# Patient Record
Sex: FEMALE | Race: WHITE | NOT HISPANIC OR LATINO | Employment: OTHER | URBAN - METROPOLITAN AREA
[De-identification: names, ages, dates, MRNs, and addresses within clinical notes are randomized per-mention and may not be internally consistent; named-entity substitution may affect disease eponyms.]

---

## 2017-05-04 ENCOUNTER — COMMUNICATION - HEALTHEAST (OUTPATIENT)
Dept: FAMILY MEDICINE | Facility: CLINIC | Age: 62
End: 2017-05-04

## 2017-05-08 ENCOUNTER — COMMUNICATION - HEALTHEAST (OUTPATIENT)
Dept: FAMILY MEDICINE | Facility: CLINIC | Age: 62
End: 2017-05-08

## 2017-05-08 ENCOUNTER — OFFICE VISIT - HEALTHEAST (OUTPATIENT)
Dept: FAMILY MEDICINE | Facility: CLINIC | Age: 62
End: 2017-05-08

## 2017-05-08 ENCOUNTER — HOSPITAL ENCOUNTER (OUTPATIENT)
Dept: LAB | Age: 62
Setting detail: SPECIMEN
Discharge: HOME | End: 2017-05-08

## 2017-05-08 DIAGNOSIS — R30.0 DYSURIA: ICD-10-CM

## 2017-05-08 ASSESSMENT — MIFFLIN-ST. JEOR: SCORE: 1349.52

## 2017-05-10 ENCOUNTER — COMMUNICATION - HEALTHEAST (OUTPATIENT)
Dept: FAMILY MEDICINE | Facility: CLINIC | Age: 62
End: 2017-05-10

## 2017-05-15 ENCOUNTER — RECORDS - HEALTHEAST (OUTPATIENT)
Dept: ADMINISTRATIVE | Facility: OTHER | Age: 62
End: 2017-05-15

## 2017-05-31 ENCOUNTER — RECORDS - HEALTHEAST (OUTPATIENT)
Dept: ADMINISTRATIVE | Facility: OTHER | Age: 62
End: 2017-05-31

## 2017-06-07 ENCOUNTER — OFFICE VISIT - HEALTHEAST (OUTPATIENT)
Dept: FAMILY MEDICINE | Facility: CLINIC | Age: 62
End: 2017-06-07

## 2017-06-07 DIAGNOSIS — N21.0 BLADDER STONE: ICD-10-CM

## 2017-06-07 DIAGNOSIS — Z01.818 PRE-OP EXAMINATION: ICD-10-CM

## 2017-06-07 LAB
ATRIAL RATE - MUSE: 64 BPM
DIASTOLIC BLOOD PRESSURE - MUSE: NORMAL MMHG
INTERPRETATION ECG - MUSE: NORMAL
P AXIS - MUSE: 22 DEGREES
PR INTERVAL - MUSE: 196 MS
QRS DURATION - MUSE: 98 MS
QT - MUSE: 430 MS
QTC - MUSE: 443 MS
R AXIS - MUSE: 2 DEGREES
SYSTOLIC BLOOD PRESSURE - MUSE: NORMAL MMHG
T AXIS - MUSE: 20 DEGREES
VENTRICULAR RATE- MUSE: 64 BPM

## 2017-06-07 ASSESSMENT — MIFFLIN-ST. JEOR: SCORE: 1363.12

## 2017-06-26 ENCOUNTER — RECORDS - HEALTHEAST (OUTPATIENT)
Dept: ADMINISTRATIVE | Facility: OTHER | Age: 62
End: 2017-06-26

## 2017-08-16 ENCOUNTER — RECORDS - HEALTHEAST (OUTPATIENT)
Dept: ADMINISTRATIVE | Facility: OTHER | Age: 62
End: 2017-08-16

## 2017-09-08 ENCOUNTER — COMMUNICATION - HEALTHEAST (OUTPATIENT)
Dept: FAMILY MEDICINE | Facility: CLINIC | Age: 62
End: 2017-09-08

## 2017-09-08 DIAGNOSIS — I10 HYPERTENSION: ICD-10-CM

## 2017-10-06 ENCOUNTER — OFFICE VISIT - HEALTHEAST (OUTPATIENT)
Dept: FAMILY MEDICINE | Facility: CLINIC | Age: 62
End: 2017-10-06

## 2017-10-06 DIAGNOSIS — I10 HTN (HYPERTENSION): ICD-10-CM

## 2017-10-06 DIAGNOSIS — Z00.00 HEALTHCARE MAINTENANCE: ICD-10-CM

## 2017-10-06 DIAGNOSIS — I10 HYPERTENSION: ICD-10-CM

## 2017-10-06 DIAGNOSIS — R42 DIZZINESS: ICD-10-CM

## 2017-10-06 ASSESSMENT — MIFFLIN-ST. JEOR: SCORE: 1354.62

## 2017-11-01 ENCOUNTER — RECORDS - HEALTHEAST (OUTPATIENT)
Dept: ADMINISTRATIVE | Facility: OTHER | Age: 62
End: 2017-11-01

## 2017-11-27 ENCOUNTER — RECORDS - HEALTHEAST (OUTPATIENT)
Dept: ADMINISTRATIVE | Facility: OTHER | Age: 62
End: 2017-11-27

## 2017-12-20 ENCOUNTER — RECORDS - HEALTHEAST (OUTPATIENT)
Dept: ADMINISTRATIVE | Facility: OTHER | Age: 62
End: 2017-12-20

## 2018-04-11 ENCOUNTER — COMMUNICATION - HEALTHEAST (OUTPATIENT)
Dept: FAMILY MEDICINE | Facility: CLINIC | Age: 63
End: 2018-04-11

## 2018-04-11 DIAGNOSIS — I10 HTN (HYPERTENSION): ICD-10-CM

## 2018-04-18 ENCOUNTER — COMMUNICATION - HEALTHEAST (OUTPATIENT)
Dept: FAMILY MEDICINE | Facility: CLINIC | Age: 63
End: 2018-04-18

## 2018-04-18 DIAGNOSIS — I10 HYPERTENSION: ICD-10-CM

## 2018-07-02 ENCOUNTER — OFFICE VISIT - HEALTHEAST (OUTPATIENT)
Dept: FAMILY MEDICINE | Facility: CLINIC | Age: 63
End: 2018-07-02

## 2018-07-02 DIAGNOSIS — Z12.11 SCREEN FOR COLON CANCER: ICD-10-CM

## 2018-07-02 DIAGNOSIS — I10 HTN (HYPERTENSION): ICD-10-CM

## 2018-07-02 DIAGNOSIS — I10 HYPERTENSION: ICD-10-CM

## 2018-07-02 LAB
ANION GAP SERPL CALCULATED.3IONS-SCNC: 10 MMOL/L (ref 5–18)
BUN SERPL-MCNC: 11 MG/DL (ref 8–22)
CALCIUM SERPL-MCNC: 9.7 MG/DL (ref 8.5–10.5)
CHLORIDE BLD-SCNC: 100 MMOL/L (ref 98–107)
CO2 SERPL-SCNC: 32 MMOL/L (ref 22–31)
CREAT SERPL-MCNC: 0.7 MG/DL (ref 0.6–1.1)
GFR SERPL CREATININE-BSD FRML MDRD: >60 ML/MIN/1.73M2
GLUCOSE BLD-MCNC: 111 MG/DL (ref 70–125)
POTASSIUM BLD-SCNC: 3.2 MMOL/L (ref 3.5–5)
SODIUM SERPL-SCNC: 142 MMOL/L (ref 136–145)

## 2018-07-02 ASSESSMENT — MIFFLIN-ST. JEOR: SCORE: 1377.02

## 2018-07-25 ENCOUNTER — RECORDS - HEALTHEAST (OUTPATIENT)
Dept: ADMINISTRATIVE | Facility: OTHER | Age: 63
End: 2018-07-25

## 2018-07-26 ENCOUNTER — RECORDS - HEALTHEAST (OUTPATIENT)
Dept: ADMINISTRATIVE | Facility: OTHER | Age: 63
End: 2018-07-26

## 2018-07-26 ENCOUNTER — AMBULATORY - HEALTHEAST (OUTPATIENT)
Dept: LAB | Facility: CLINIC | Age: 63
End: 2018-07-26

## 2018-07-26 ENCOUNTER — COMMUNICATION - HEALTHEAST (OUTPATIENT)
Dept: FAMILY MEDICINE | Facility: CLINIC | Age: 63
End: 2018-07-26

## 2018-07-26 DIAGNOSIS — M79.672 PAIN IN BOTH FEET: ICD-10-CM

## 2018-07-26 DIAGNOSIS — M79.671 PAIN IN BOTH FEET: ICD-10-CM

## 2018-07-27 ENCOUNTER — AMBULATORY - HEALTHEAST (OUTPATIENT)
Dept: LAB | Facility: CLINIC | Age: 63
End: 2018-07-27

## 2018-07-27 DIAGNOSIS — M79.672 PAIN IN BOTH FEET: ICD-10-CM

## 2018-07-27 DIAGNOSIS — M79.671 PAIN IN BOTH FEET: ICD-10-CM

## 2018-07-27 LAB
HBA1C MFR BLD: 5.5 % (ref 3.5–6)
TSH SERPL DL<=0.005 MIU/L-ACNC: 0.99 UIU/ML (ref 0.3–5)
VIT B12 SERPL-MCNC: 448 PG/ML (ref 213–816)

## 2018-08-02 ENCOUNTER — COMMUNICATION - HEALTHEAST (OUTPATIENT)
Dept: FAMILY MEDICINE | Facility: CLINIC | Age: 63
End: 2018-08-02

## 2018-09-05 ENCOUNTER — RECORDS - HEALTHEAST (OUTPATIENT)
Dept: ADMINISTRATIVE | Facility: OTHER | Age: 63
End: 2018-09-05

## 2018-09-06 ENCOUNTER — RECORDS - HEALTHEAST (OUTPATIENT)
Dept: ADMINISTRATIVE | Facility: OTHER | Age: 63
End: 2018-09-06

## 2018-09-25 ENCOUNTER — COMMUNICATION - HEALTHEAST (OUTPATIENT)
Dept: FAMILY MEDICINE | Facility: CLINIC | Age: 63
End: 2018-09-25

## 2018-09-27 ENCOUNTER — AMBULATORY - HEALTHEAST (OUTPATIENT)
Dept: NURSING | Facility: CLINIC | Age: 63
End: 2018-09-27

## 2018-09-27 DIAGNOSIS — Z23 FLU VACCINE NEED: ICD-10-CM

## 2018-11-13 ENCOUNTER — RECORDS - HEALTHEAST (OUTPATIENT)
Dept: ADMINISTRATIVE | Facility: OTHER | Age: 63
End: 2018-11-13

## 2018-11-15 ENCOUNTER — COMMUNICATION - HEALTHEAST (OUTPATIENT)
Dept: FAMILY MEDICINE | Facility: CLINIC | Age: 63
End: 2018-11-15

## 2018-11-15 DIAGNOSIS — Z12.31 ENCOUNTER FOR SCREENING MAMMOGRAM FOR BREAST CANCER: ICD-10-CM

## 2018-11-30 ENCOUNTER — COMMUNICATION - HEALTHEAST (OUTPATIENT)
Dept: FAMILY MEDICINE | Facility: CLINIC | Age: 63
End: 2018-11-30

## 2019-01-02 ENCOUNTER — COMMUNICATION - HEALTHEAST (OUTPATIENT)
Dept: FAMILY MEDICINE | Facility: CLINIC | Age: 64
End: 2019-01-02

## 2019-01-02 DIAGNOSIS — I10 HTN (HYPERTENSION): ICD-10-CM

## 2019-01-07 ENCOUNTER — COMMUNICATION - HEALTHEAST (OUTPATIENT)
Dept: FAMILY MEDICINE | Facility: CLINIC | Age: 64
End: 2019-01-07

## 2019-01-07 DIAGNOSIS — I10 HTN (HYPERTENSION): ICD-10-CM

## 2019-01-14 ENCOUNTER — OFFICE VISIT - HEALTHEAST (OUTPATIENT)
Dept: FAMILY MEDICINE | Facility: CLINIC | Age: 64
End: 2019-01-14

## 2019-01-14 DIAGNOSIS — I10 HTN (HYPERTENSION): ICD-10-CM

## 2019-01-14 DIAGNOSIS — I10 BENIGN ESSENTIAL HYPERTENSION: ICD-10-CM

## 2019-01-14 DIAGNOSIS — R05.9 COUGH: ICD-10-CM

## 2019-01-14 LAB
ANION GAP SERPL CALCULATED.3IONS-SCNC: 8 MMOL/L (ref 5–18)
BUN SERPL-MCNC: 8 MG/DL (ref 8–22)
CALCIUM SERPL-MCNC: 9.4 MG/DL (ref 8.5–10.5)
CHLORIDE BLD-SCNC: 98 MMOL/L (ref 98–107)
CHOLEST SERPL-MCNC: 165 MG/DL
CO2 SERPL-SCNC: 34 MMOL/L (ref 22–31)
CREAT SERPL-MCNC: 0.69 MG/DL (ref 0.6–1.1)
FASTING STATUS PATIENT QL REPORTED: NO
GFR SERPL CREATININE-BSD FRML MDRD: >60 ML/MIN/1.73M2
GLUCOSE BLD-MCNC: 100 MG/DL (ref 70–125)
HDLC SERPL-MCNC: 53 MG/DL
LDLC SERPL CALC-MCNC: 95 MG/DL
POTASSIUM BLD-SCNC: 3.3 MMOL/L (ref 3.5–5)
SODIUM SERPL-SCNC: 140 MMOL/L (ref 136–145)
TRIGL SERPL-MCNC: 87 MG/DL

## 2019-01-14 ASSESSMENT — MIFFLIN-ST. JEOR: SCORE: 1395.45

## 2019-01-16 ENCOUNTER — COMMUNICATION - HEALTHEAST (OUTPATIENT)
Dept: FAMILY MEDICINE | Facility: CLINIC | Age: 64
End: 2019-01-16

## 2019-01-16 DIAGNOSIS — I10 HTN (HYPERTENSION): ICD-10-CM

## 2019-02-01 ENCOUNTER — COMMUNICATION - HEALTHEAST (OUTPATIENT)
Dept: FAMILY MEDICINE | Facility: CLINIC | Age: 64
End: 2019-02-01

## 2019-02-01 DIAGNOSIS — R05.9 COUGH: ICD-10-CM

## 2019-03-04 ENCOUNTER — COMMUNICATION - HEALTHEAST (OUTPATIENT)
Dept: FAMILY MEDICINE | Facility: CLINIC | Age: 64
End: 2019-03-04

## 2019-03-06 ENCOUNTER — RECORDS - HEALTHEAST (OUTPATIENT)
Dept: ADMINISTRATIVE | Facility: OTHER | Age: 64
End: 2019-03-06

## 2019-03-14 ENCOUNTER — RECORDS - HEALTHEAST (OUTPATIENT)
Dept: ADMINISTRATIVE | Facility: OTHER | Age: 64
End: 2019-03-14

## 2019-03-22 ENCOUNTER — OFFICE VISIT - HEALTHEAST (OUTPATIENT)
Dept: FAMILY MEDICINE | Facility: CLINIC | Age: 64
End: 2019-03-22

## 2019-03-22 ENCOUNTER — COMMUNICATION - HEALTHEAST (OUTPATIENT)
Dept: FAMILY MEDICINE | Facility: CLINIC | Age: 64
End: 2019-03-22

## 2019-03-22 DIAGNOSIS — I10 BENIGN ESSENTIAL HYPERTENSION: ICD-10-CM

## 2019-03-22 DIAGNOSIS — Z01.818 PREOPERATIVE EXAMINATION: ICD-10-CM

## 2019-03-22 DIAGNOSIS — H04.203 BILATERAL EPIPHORA: ICD-10-CM

## 2019-03-22 DIAGNOSIS — E87.6 HYPOKALEMIA: ICD-10-CM

## 2019-03-22 LAB
ANION GAP SERPL CALCULATED.3IONS-SCNC: 13 MMOL/L (ref 5–18)
ATRIAL RATE - MUSE: 67 BPM
BUN SERPL-MCNC: 16 MG/DL (ref 8–22)
CALCIUM SERPL-MCNC: 9.4 MG/DL (ref 8.5–10.5)
CHLORIDE BLD-SCNC: 102 MMOL/L (ref 98–107)
CO2 SERPL-SCNC: 29 MMOL/L (ref 22–31)
CREAT SERPL-MCNC: 0.72 MG/DL (ref 0.6–1.1)
DIASTOLIC BLOOD PRESSURE - MUSE: NORMAL MMHG
ERYTHROCYTE [DISTWIDTH] IN BLOOD BY AUTOMATED COUNT: 13.3 % (ref 11–14.5)
GFR SERPL CREATININE-BSD FRML MDRD: >60 ML/MIN/1.73M2
GLUCOSE BLD-MCNC: 86 MG/DL (ref 70–125)
HCT VFR BLD AUTO: 40.4 % (ref 35–47)
HGB BLD-MCNC: 13.8 G/DL (ref 12–16)
INTERPRETATION ECG - MUSE: NORMAL
MCH RBC QN AUTO: 30.1 PG (ref 27–34)
MCHC RBC AUTO-ENTMCNC: 34.2 G/DL (ref 32–36)
MCV RBC AUTO: 88 FL (ref 80–100)
P AXIS - MUSE: -16 DEGREES
PLATELET # BLD AUTO: 233 THOU/UL (ref 140–440)
PMV BLD AUTO: 7.2 FL (ref 7–10)
POTASSIUM BLD-SCNC: 2.8 MMOL/L (ref 3.5–5)
PR INTERVAL - MUSE: 158 MS
QRS DURATION - MUSE: 98 MS
QT - MUSE: 442 MS
QTC - MUSE: 467 MS
R AXIS - MUSE: 12 DEGREES
RBC # BLD AUTO: 4.58 MILL/UL (ref 3.8–5.4)
SODIUM SERPL-SCNC: 144 MMOL/L (ref 136–145)
SYSTOLIC BLOOD PRESSURE - MUSE: NORMAL MMHG
T AXIS - MUSE: 46 DEGREES
VENTRICULAR RATE- MUSE: 67 BPM
WBC: 10.2 THOU/UL (ref 4–11)

## 2019-03-22 ASSESSMENT — MIFFLIN-ST. JEOR: SCORE: 1387.22

## 2019-03-26 ENCOUNTER — AMBULATORY - HEALTHEAST (OUTPATIENT)
Dept: LAB | Facility: CLINIC | Age: 64
End: 2019-03-26

## 2019-03-26 DIAGNOSIS — E87.6 HYPOKALEMIA: ICD-10-CM

## 2019-03-26 LAB
ANION GAP SERPL CALCULATED.3IONS-SCNC: 10 MMOL/L (ref 5–18)
BUN SERPL-MCNC: 11 MG/DL (ref 8–22)
CALCIUM SERPL-MCNC: 9.1 MG/DL (ref 8.5–10.5)
CHLORIDE BLD-SCNC: 100 MMOL/L (ref 98–107)
CO2 SERPL-SCNC: 31 MMOL/L (ref 22–31)
CREAT SERPL-MCNC: 0.66 MG/DL (ref 0.6–1.1)
GFR SERPL CREATININE-BSD FRML MDRD: >60 ML/MIN/1.73M2
GLUCOSE BLD-MCNC: 82 MG/DL (ref 70–125)
POTASSIUM BLD-SCNC: 3.7 MMOL/L (ref 3.5–5)
SODIUM SERPL-SCNC: 141 MMOL/L (ref 136–145)

## 2019-04-05 ENCOUNTER — COMMUNICATION - HEALTHEAST (OUTPATIENT)
Dept: FAMILY MEDICINE | Facility: CLINIC | Age: 64
End: 2019-04-05

## 2019-04-09 ENCOUNTER — RECORDS - HEALTHEAST (OUTPATIENT)
Dept: ADMINISTRATIVE | Facility: OTHER | Age: 64
End: 2019-04-09

## 2019-05-29 ENCOUNTER — RECORDS - HEALTHEAST (OUTPATIENT)
Dept: ADMINISTRATIVE | Facility: OTHER | Age: 64
End: 2019-05-29

## 2019-07-09 ENCOUNTER — COMMUNICATION - HEALTHEAST (OUTPATIENT)
Dept: FAMILY MEDICINE | Facility: CLINIC | Age: 64
End: 2019-07-09

## 2019-07-09 DIAGNOSIS — I10 HTN (HYPERTENSION): ICD-10-CM

## 2019-07-19 ENCOUNTER — OFFICE VISIT - HEALTHEAST (OUTPATIENT)
Dept: FAMILY MEDICINE | Facility: CLINIC | Age: 64
End: 2019-07-19

## 2019-07-19 DIAGNOSIS — Z23 ENCOUNTER FOR IMMUNIZATION: ICD-10-CM

## 2019-07-19 DIAGNOSIS — Z00.00 ROUTINE GENERAL MEDICAL EXAMINATION AT A HEALTH CARE FACILITY: ICD-10-CM

## 2019-07-19 DIAGNOSIS — E87.6 HYPOKALEMIA: ICD-10-CM

## 2019-07-19 DIAGNOSIS — I15.9 SECONDARY HYPERTENSION: ICD-10-CM

## 2019-07-19 LAB
ANION GAP SERPL CALCULATED.3IONS-SCNC: 10 MMOL/L (ref 5–18)
BUN SERPL-MCNC: 10 MG/DL (ref 8–22)
CALCIUM SERPL-MCNC: 9.5 MG/DL (ref 8.5–10.5)
CHLORIDE BLD-SCNC: 101 MMOL/L (ref 98–107)
CO2 SERPL-SCNC: 31 MMOL/L (ref 22–31)
CREAT SERPL-MCNC: 0.66 MG/DL (ref 0.6–1.1)
GFR SERPL CREATININE-BSD FRML MDRD: >60 ML/MIN/1.73M2
GLUCOSE BLD-MCNC: 103 MG/DL (ref 70–125)
POTASSIUM BLD-SCNC: 3 MMOL/L (ref 3.5–5)
SODIUM SERPL-SCNC: 142 MMOL/L (ref 136–145)

## 2019-07-19 ASSESSMENT — MIFFLIN-ST. JEOR: SCORE: 1360.29

## 2019-09-09 ENCOUNTER — RECORDS - HEALTHEAST (OUTPATIENT)
Dept: ADMINISTRATIVE | Facility: OTHER | Age: 64
End: 2019-09-09

## 2019-09-23 ENCOUNTER — RECORDS - HEALTHEAST (OUTPATIENT)
Dept: ADMINISTRATIVE | Facility: OTHER | Age: 64
End: 2019-09-23

## 2019-10-10 ENCOUNTER — RECORDS - HEALTHEAST (OUTPATIENT)
Dept: ADMINISTRATIVE | Facility: OTHER | Age: 64
End: 2019-10-10

## 2019-10-18 ENCOUNTER — COMMUNICATION - HEALTHEAST (OUTPATIENT)
Dept: FAMILY MEDICINE | Facility: CLINIC | Age: 64
End: 2019-10-18

## 2019-10-23 ENCOUNTER — OFFICE VISIT - HEALTHEAST (OUTPATIENT)
Dept: FAMILY MEDICINE | Facility: CLINIC | Age: 64
End: 2019-10-23

## 2019-10-23 DIAGNOSIS — R06.02 SHORTNESS OF BREATH: ICD-10-CM

## 2019-10-23 DIAGNOSIS — Q79.0 BOCHDALEK HERNIA: ICD-10-CM

## 2019-10-23 DIAGNOSIS — Z23 FLU VACCINE NEED: ICD-10-CM

## 2019-10-23 ASSESSMENT — MIFFLIN-ST. JEOR: SCORE: 1346.68

## 2019-12-13 ENCOUNTER — OFFICE VISIT - HEALTHEAST (OUTPATIENT)
Dept: FAMILY MEDICINE | Facility: CLINIC | Age: 64
End: 2019-12-13

## 2019-12-13 DIAGNOSIS — Z01.818 PREOPERATIVE EXAMINATION: ICD-10-CM

## 2019-12-13 DIAGNOSIS — Q79.0 MORGAGNI HERNIA: ICD-10-CM

## 2019-12-13 DIAGNOSIS — Z12.31 VISIT FOR SCREENING MAMMOGRAM: ICD-10-CM

## 2019-12-13 LAB
ANION GAP SERPL CALCULATED.3IONS-SCNC: 9 MMOL/L (ref 5–18)
BUN SERPL-MCNC: 9 MG/DL (ref 8–22)
CALCIUM SERPL-MCNC: 9.6 MG/DL (ref 8.5–10.5)
CHLORIDE BLD-SCNC: 100 MMOL/L (ref 98–107)
CO2 SERPL-SCNC: 32 MMOL/L (ref 22–31)
CREAT SERPL-MCNC: 0.7 MG/DL (ref 0.6–1.1)
ERYTHROCYTE [DISTWIDTH] IN BLOOD BY AUTOMATED COUNT: 11.8 % (ref 11–14.5)
GFR SERPL CREATININE-BSD FRML MDRD: >60 ML/MIN/1.73M2
GLUCOSE BLD-MCNC: 102 MG/DL (ref 70–125)
HCT VFR BLD AUTO: 39.7 % (ref 35–47)
HGB BLD-MCNC: 13.5 G/DL (ref 12–16)
MCH RBC QN AUTO: 30.2 PG (ref 27–34)
MCHC RBC AUTO-ENTMCNC: 34 G/DL (ref 32–36)
MCV RBC AUTO: 89 FL (ref 80–100)
PLATELET # BLD AUTO: 238 THOU/UL (ref 140–440)
PMV BLD AUTO: 7.6 FL (ref 7–10)
POTASSIUM BLD-SCNC: 3.4 MMOL/L (ref 3.5–5)
RBC # BLD AUTO: 4.47 MILL/UL (ref 3.8–5.4)
SODIUM SERPL-SCNC: 141 MMOL/L (ref 136–145)
WBC: 6.6 THOU/UL (ref 4–11)

## 2019-12-13 ASSESSMENT — MIFFLIN-ST. JEOR: SCORE: 1363.13

## 2020-02-27 ENCOUNTER — COMMUNICATION - HEALTHEAST (OUTPATIENT)
Dept: FAMILY MEDICINE | Facility: CLINIC | Age: 65
End: 2020-02-27

## 2020-03-24 ENCOUNTER — COMMUNICATION - HEALTHEAST (OUTPATIENT)
Dept: FAMILY MEDICINE | Facility: CLINIC | Age: 65
End: 2020-03-24

## 2020-03-24 DIAGNOSIS — I10 HTN (HYPERTENSION): ICD-10-CM

## 2020-03-25 ENCOUNTER — COMMUNICATION - HEALTHEAST (OUTPATIENT)
Dept: FAMILY MEDICINE | Facility: CLINIC | Age: 65
End: 2020-03-25

## 2020-03-29 ENCOUNTER — COMMUNICATION - HEALTHEAST (OUTPATIENT)
Dept: FAMILY MEDICINE | Facility: CLINIC | Age: 65
End: 2020-03-29

## 2020-03-29 DIAGNOSIS — I10 HTN (HYPERTENSION): ICD-10-CM

## 2020-04-10 ENCOUNTER — COMMUNICATION - HEALTHEAST (OUTPATIENT)
Dept: FAMILY MEDICINE | Facility: CLINIC | Age: 65
End: 2020-04-10

## 2020-05-07 ENCOUNTER — COMMUNICATION - HEALTHEAST (OUTPATIENT)
Dept: FAMILY MEDICINE | Facility: CLINIC | Age: 65
End: 2020-05-07

## 2020-05-07 DIAGNOSIS — R05.9 COUGH: ICD-10-CM

## 2020-07-08 ENCOUNTER — RECORDS - HEALTHEAST (OUTPATIENT)
Dept: ADMINISTRATIVE | Facility: OTHER | Age: 65
End: 2020-07-08

## 2020-10-02 ENCOUNTER — RECORDS - HEALTHEAST (OUTPATIENT)
Dept: ADMINISTRATIVE | Facility: OTHER | Age: 65
End: 2020-10-02

## 2020-10-13 ENCOUNTER — COMMUNICATION - HEALTHEAST (OUTPATIENT)
Dept: FAMILY MEDICINE | Facility: CLINIC | Age: 65
End: 2020-10-13

## 2020-10-13 DIAGNOSIS — Z01.818 PREOPERATIVE EXAMINATION: ICD-10-CM

## 2020-10-19 ENCOUNTER — OFFICE VISIT - HEALTHEAST (OUTPATIENT)
Dept: FAMILY MEDICINE | Facility: CLINIC | Age: 65
End: 2020-10-19

## 2020-10-19 DIAGNOSIS — Z01.818 PREOPERATIVE EXAMINATION: ICD-10-CM

## 2020-10-19 DIAGNOSIS — M54.42 CHRONIC BILATERAL LOW BACK PAIN WITH LEFT-SIDED SCIATICA: ICD-10-CM

## 2020-10-19 DIAGNOSIS — G89.29 CHRONIC BILATERAL LOW BACK PAIN WITH LEFT-SIDED SCIATICA: ICD-10-CM

## 2020-10-19 LAB
ANION GAP SERPL CALCULATED.3IONS-SCNC: 12 MMOL/L (ref 5–18)
ATRIAL RATE - MUSE: 77 BPM
BUN SERPL-MCNC: 10 MG/DL (ref 8–22)
CALCIUM SERPL-MCNC: 9.3 MG/DL (ref 8.5–10.5)
CHLORIDE BLD-SCNC: 101 MMOL/L (ref 98–107)
CO2 SERPL-SCNC: 29 MMOL/L (ref 22–31)
CREAT SERPL-MCNC: 0.66 MG/DL (ref 0.6–1.1)
DIASTOLIC BLOOD PRESSURE - MUSE: NORMAL
ERYTHROCYTE [DISTWIDTH] IN BLOOD BY AUTOMATED COUNT: 12.3 % (ref 11–14.5)
GFR SERPL CREATININE-BSD FRML MDRD: >60 ML/MIN/1.73M2
GLUCOSE BLD-MCNC: 98 MG/DL (ref 70–125)
HCT VFR BLD AUTO: 42.6 % (ref 35–47)
HGB BLD-MCNC: 14.5 G/DL (ref 12–16)
INTERPRETATION ECG - MUSE: NORMAL
MCH RBC QN AUTO: 30.4 PG (ref 27–34)
MCHC RBC AUTO-ENTMCNC: 34 G/DL (ref 32–36)
MCV RBC AUTO: 89 FL (ref 80–100)
P AXIS - MUSE: 56 DEGREES
PLATELET # BLD AUTO: 222 THOU/UL (ref 140–440)
PMV BLD AUTO: 7 FL (ref 7–10)
POTASSIUM BLD-SCNC: 3.4 MMOL/L (ref 3.5–5)
PR INTERVAL - MUSE: 208 MS
QRS DURATION - MUSE: 90 MS
QT - MUSE: 388 MS
QTC - MUSE: 439 MS
R AXIS - MUSE: -1 DEGREES
RBC # BLD AUTO: 4.76 MILL/UL (ref 3.8–5.4)
SODIUM SERPL-SCNC: 142 MMOL/L (ref 136–145)
SYSTOLIC BLOOD PRESSURE - MUSE: NORMAL
T AXIS - MUSE: 7 DEGREES
VENTRICULAR RATE- MUSE: 77 BPM
WBC: 6.5 THOU/UL (ref 4–11)

## 2020-10-19 ASSESSMENT — MIFFLIN-ST. JEOR: SCORE: 1359.16

## 2020-10-21 ENCOUNTER — RECORDS - HEALTHEAST (OUTPATIENT)
Dept: ADMINISTRATIVE | Facility: OTHER | Age: 65
End: 2020-10-21

## 2020-10-21 ENCOUNTER — COMMUNICATION - HEALTHEAST (OUTPATIENT)
Dept: SCHEDULING | Facility: CLINIC | Age: 65
End: 2020-10-21

## 2020-11-05 ENCOUNTER — RECORDS - HEALTHEAST (OUTPATIENT)
Dept: ADMINISTRATIVE | Facility: OTHER | Age: 65
End: 2020-11-05

## 2020-11-10 ENCOUNTER — OFFICE VISIT - HEALTHEAST (OUTPATIENT)
Dept: FAMILY MEDICINE | Facility: CLINIC | Age: 65
End: 2020-11-10

## 2020-11-10 DIAGNOSIS — Z00.00 ROUTINE GENERAL MEDICAL EXAMINATION AT A HEALTH CARE FACILITY: ICD-10-CM

## 2020-11-10 DIAGNOSIS — I10 HTN (HYPERTENSION): ICD-10-CM

## 2020-11-10 DIAGNOSIS — R05.9 COUGH: ICD-10-CM

## 2020-11-10 LAB
ALBUMIN SERPL-MCNC: 4.3 G/DL (ref 3.5–5)
ALP SERPL-CCNC: 78 U/L (ref 45–120)
ALT SERPL W P-5'-P-CCNC: 14 U/L (ref 0–45)
ANION GAP SERPL CALCULATED.3IONS-SCNC: 9 MMOL/L (ref 5–18)
AST SERPL W P-5'-P-CCNC: 14 U/L (ref 0–40)
BILIRUB SERPL-MCNC: 0.7 MG/DL (ref 0–1)
BUN SERPL-MCNC: 8 MG/DL (ref 8–22)
CALCIUM SERPL-MCNC: 9.2 MG/DL (ref 8.5–10.5)
CHLORIDE BLD-SCNC: 101 MMOL/L (ref 98–107)
CHOLEST SERPL-MCNC: 173 MG/DL
CO2 SERPL-SCNC: 32 MMOL/L (ref 22–31)
CREAT SERPL-MCNC: 0.64 MG/DL (ref 0.6–1.1)
FASTING STATUS PATIENT QL REPORTED: YES
GFR SERPL CREATININE-BSD FRML MDRD: >60 ML/MIN/1.73M2
GLUCOSE BLD-MCNC: 99 MG/DL (ref 70–125)
HDLC SERPL-MCNC: 53 MG/DL
LDLC SERPL CALC-MCNC: 106 MG/DL
POTASSIUM BLD-SCNC: 3.4 MMOL/L (ref 3.5–5)
PROT SERPL-MCNC: 7 G/DL (ref 6–8)
SODIUM SERPL-SCNC: 142 MMOL/L (ref 136–145)
TRIGL SERPL-MCNC: 71 MG/DL

## 2020-11-10 RX ORDER — HYDROCHLOROTHIAZIDE 25 MG/1
25 TABLET ORAL DAILY
Qty: 90 TABLET | Refills: 3 | Status: SHIPPED | OUTPATIENT
Start: 2020-11-10 | End: 2022-01-14

## 2020-11-10 RX ORDER — ALBUTEROL SULFATE 90 UG/1
2 AEROSOL, METERED RESPIRATORY (INHALATION) EVERY 6 HOURS PRN
Qty: 1 EACH | Refills: 0 | Status: SHIPPED | OUTPATIENT
Start: 2020-11-10 | End: 2024-02-09

## 2020-11-10 RX ORDER — AMLODIPINE BESYLATE 5 MG/1
5 TABLET ORAL DAILY
Qty: 90 TABLET | Refills: 3 | Status: SHIPPED | OUTPATIENT
Start: 2020-11-10 | End: 2021-10-08

## 2020-11-10 RX ORDER — POTASSIUM CITRATE 10 MEQ/1
20 TABLET, EXTENDED RELEASE ORAL 2 TIMES DAILY
Qty: 360 TABLET | Refills: 3 | Status: SHIPPED | OUTPATIENT
Start: 2020-11-10 | End: 2022-05-27

## 2020-11-10 RX ORDER — CARVEDILOL 12.5 MG/1
12.5 TABLET ORAL 2 TIMES DAILY WITH MEALS
Qty: 180 TABLET | Refills: 3 | Status: SHIPPED | OUTPATIENT
Start: 2020-11-10 | End: 2022-01-14

## 2020-11-10 ASSESSMENT — MIFFLIN-ST. JEOR: SCORE: 1355.76

## 2020-12-03 ENCOUNTER — RECORDS - HEALTHEAST (OUTPATIENT)
Dept: ADMINISTRATIVE | Facility: OTHER | Age: 65
End: 2020-12-03

## 2021-01-12 ENCOUNTER — RECORDS - HEALTHEAST (OUTPATIENT)
Dept: ADMINISTRATIVE | Facility: OTHER | Age: 66
End: 2021-01-12

## 2021-03-30 ENCOUNTER — RECORDS - HEALTHEAST (OUTPATIENT)
Dept: ADMINISTRATIVE | Facility: OTHER | Age: 66
End: 2021-03-30

## 2021-04-11 ENCOUNTER — HEALTH MAINTENANCE LETTER (OUTPATIENT)
Age: 66
End: 2021-04-11

## 2021-05-26 NOTE — TELEPHONE ENCOUNTER
Called and left message - I will have her take 2 of her potassium citrate 3 times daily and follow up next week for labs    BB

## 2021-05-26 NOTE — PROGRESS NOTES
Preoperative Exam    Scheduled Procedure: Tightening the lower eyelids  Surgery Date:  4/10/19  Surgery Location: [facility]    Surgeon:  Dr. Scott    Assessment/Plan:     1. Preoperative examination  - Electrocardiogram Perform and Read  - Basic Metabolic Panel  - HM2(CBC w/o Differential)    2. Bilateral epiphora    3. Hypokalemia  Initial potassium at 2.8 - she had not been good at taking her potassium supplement and she began taking 20mg two times a day - repeat potassium on 3/26/19 was 3.7  - Basic Metabolic Panel; Future     Surgical Procedure Risk: Low (reported cardiac risk generally < 1%)  Have you had prior anesthesia?: Yes  Have you or any family members had a previous anesthesia reaction:  No  Do you or any family members have a history of a clotting or bleeding disorder?: No  Cardiac Risk Assessment: no increased risk for major cardiac complications    Patient approved for surgery with general or local anesthesia.    Please Note:  Potentially has borderline sleep apnea - planning on seeing sleep specialist but untreated at this time    Functional Status: Independent  Patient plans to recover at home with family.     Subjective:      Kelsey Acevedo is a 63 y.o. female who presents for a preoperative consultation.      She has a past medical history significant for hypertension which is well controlled with amlodipine, carvedilol and hydrochlorothiazide.  The patient does have a tendency to have somewhat low potassium and is taking potassium citrate 10 mEq twice daily.  She unfortunately has not been taking this medication on a very regular basis    She is otherwise doing well.    All other systems reviewed and are negative, other than those listed in the HPI.    Pertinent History  Do you have difficulty breathing or chest pain after walking up a flight of stairs: No  History of obstructive sleep apnea: Yes: mild - could not tolerate mask - will plan on seeing sleep specialist for  re-evaluation soon  Steroid use in the last 6 months: Yes: Back Injection  Frequent Aspirin/NSAID use: No  Prior Blood Transfusion: No  Prior Blood Transfusion Reaction: No  If for some reason prior to, during or after the procedure, if it is medically indicated, would you be willing to have a blood transfusion?:  There is no transfusion refusal.    Current Outpatient Medications   Medication Sig Dispense Refill     amLODIPine (NORVASC) 5 MG tablet TAKE ONE TABLET BY MOUTH EVERY DAY 90 tablet 1     carvedilol (COREG) 12.5 MG tablet TAKE ONE TABLET BY MOUTH TWICE A DAY WITH MEALS 180 tablet 1     gabapentin (NEURONTIN) 300 MG capsule   0     hydroCHLOROthiazide (HYDRODIURIL) 25 MG tablet TAKE ONE TABLET BY MOUTH EVERY DAY 90 tablet 1     potassium citrate (UROCIT-K) 10 mEq (1,080 mg) SR tablet Take 2 tablets (20 mEq total) by mouth 2 (two) times a day. 360 tablet 1     No current facility-administered medications for this visit.         Allergies   Allergen Reactions     Erythromycin Hives     Sulfa (Sulfonamide Antibiotics) Hives       Patient Active Problem List   Diagnosis     Diverticular disease of colon     Benign essential hypertension       Past Medical History:   Diagnosis Date     Bladder stone      Hypertension        Past Surgical History:   Procedure Laterality Date     bladder prolapse       BLADDER STONE REMOVAL       FOOT SURGERY       HYSTERECTOMY         Social History     Socioeconomic History     Marital status:      Spouse name: Not on file     Number of children: Not on file     Years of education: Not on file     Highest education level: Not on file   Occupational History     Not on file   Social Needs     Financial resource strain: Not on file     Food insecurity:     Worry: Not on file     Inability: Not on file     Transportation needs:     Medical: Not on file     Non-medical: Not on file   Tobacco Use     Smoking status: Former Smoker     Smokeless tobacco: Never Used   Substance  "and Sexual Activity     Alcohol use: Not on file     Drug use: Not on file     Sexual activity: Not on file   Lifestyle     Physical activity:     Days per week: Not on file     Minutes per session: Not on file     Stress: Not on file   Relationships     Social connections:     Talks on phone: Not on file     Gets together: Not on file     Attends [employer] service: Not on file     Active member of club or organization: Not on file     Attends meetings of clubs or organizations: Not on file     Relationship status: Not on file     Intimate partner violence:     Fear of current or ex partner: Not on file     Emotionally abused: Not on file     Physically abused: Not on file     Forced sexual activity: Not on file   Other Topics Concern     Not on file   Social History Narrative     Not on file       Patient Care Team:  Millie De Paz MD as PCP - General (Family Medicine)          Objective:     Vitals:    03/22/19 1107   BP: 128/80   Pulse: 76   Resp: 16   Temp: 97.9  F (36.6  C)   TempSrc: Oral   Weight: 196 lb 7 oz (89.1 kg)   Height: 5' 2.5\" (1.588 m)         Physical Exam:  Objective:  Vital signs reviewed and stable  General: No acute distress  Psych: Appropriate affect  HEENT: moist mucous membranes, pupils equal, round, reactive to light and accomodation, posterior oropharynx clear of erythema or exudate, tympanic membranes are pearly grey bilaterally  Lymph: no cervical or supraclavicular lymphadenopathy  Cardiovascular: regular rate and rhythm with no murmur  Pulmonary: clear to auscultation bilaterally with no wheeze  Abdomen: soft, non tender, non distended with normo-active bowel sounds  Extremities: warm and well perfused with no edema  Skin: warm and dry with no rash      There are no Patient Instructions on file for this visit.    Labs:  Recent Results (from the past 240 hour(s))   Electrocardiogram Perform and Read    Collection Time: 03/22/19 11:15 AM   Result Value Ref Range    SYSTOLIC " BLOOD PRESSURE  mmHg    DIASTOLIC BLOOD PRESSURE  mmHg    VENTRICULAR RATE 67 BPM    ATRIAL RATE 67 BPM    P-R INTERVAL 158 ms    QRS DURATION 98 ms    Q-T INTERVAL 442 ms    QTC CALCULATION (BEZET) 467 ms    P Axis -16 degrees    R AXIS 12 degrees    T AXIS 46 degrees    MUSE DIAGNOSIS       Normal sinus rhythm  Normal ECG  When compared with ECG of 07-JUN-2017 13:41,  Nonspecific T wave abnormality now evident in Lateral leads  Confirmed by FRANCO LAIRD MD LOC:J[facility] ([id number]) on 3/22/2019 2:30:18 PM     Basic Metabolic Panel    Collection Time: 03/22/19 11:35 AM   Result Value Ref Range    Sodium 144 136 - 145 mmol/L    Potassium 2.8 (LL) 3.5 - 5.0 mmol/L    Chloride 102 98 - 107 mmol/L    CO2 29 22 - 31 mmol/L    Anion Gap, Calculation 13 5 - 18 mmol/L    Glucose 86 70 - 125 mg/dL    Calcium 9.4 8.5 - 10.5 mg/dL    BUN 16 8 - 22 mg/dL    Creatinine 0.72 0.60 - 1.10 mg/dL    GFR MDRD Af Amer >60 >60 mL/min/1.73m2    GFR MDRD Non Af Amer >60 >60 mL/min/1.73m2   HM2(CBC w/o Differential)    Collection Time: 03/22/19 11:35 AM   Result Value Ref Range    WBC 10.2 4.0 - 11.0 thou/uL    RBC 4.58 3.80 - 5.40 mill/uL    Hemoglobin 13.8 12.0 - 16.0 g/dL    Hematocrit 40.4 35.0 - 47.0 %    MCV 88 80 - 100 fL    MCH 30.1 27.0 - 34.0 pg    MCHC 34.2 32.0 - 36.0 g/dL    RDW 13.3 11.0 - 14.5 %    Platelets 233 140 - 440 thou/uL    MPV 7.2 7.0 - 10.0 fL   Basic Metabolic Panel    Collection Time: 03/26/19  1:33 PM   Result Value Ref Range    Sodium 141 136 - 145 mmol/L    Potassium 3.7 3.5 - 5.0 mmol/L    Chloride 100 98 - 107 mmol/L    CO2 31 22 - 31 mmol/L    Anion Gap, Calculation 10 5 - 18 mmol/L    Glucose 82 70 - 125 mg/dL    Calcium 9.1 8.5 - 10.5 mg/dL    BUN 11 8 - 22 mg/dL    Creatinine 0.66 0.60 - 1.10 mg/dL    GFR MDRD Af Amer >60 >60 mL/min/1.73m2    GFR MDRD Non Af Amer >60 >60 mL/min/1.73m2       Immunization History   Administered Date(s) Administered     DT (pediatric) 01/01/2001     Influenza [id number], 12/04/2009      Influenza high dose, seasonal 09/27/2018     Influenza, Seasonal, Inj PF IIV3 02/09/2016     Influenza, inj, historic,unspecified 11/28/2006, 09/18/2009, 09/28/2011, 11/05/2012, 02/14/2014     Influenza,inj,MDCK,PF,Quad >4yrs 01/19/2018     Influenza,seasonal quad, PF, 36+MOS 02/03/2017     Tdap 10/06/2017           Electronically signed by Millie De Paz MD 03/26/19 11:20 AM

## 2021-05-30 VITALS — BODY MASS INDEX: 33.18 KG/M2 | HEIGHT: 63 IN | WEIGHT: 187.25 LBS

## 2021-05-30 VITALS — BODY MASS INDEX: 33.4 KG/M2 | HEIGHT: 63 IN | WEIGHT: 188.5 LBS

## 2021-05-30 NOTE — PROGRESS NOTES
Assessment/Plan:    Kelsey Acevedo is a 63 y.o. female presenting for:    1. Secondary hypertension  Blood pressure within goal today.  Continue medications.  - Basic Metabolic Panel    2. Hypokalemia  - Basic Metabolic Panel    3. Routine general medical examination at a health care facility  - Pneumococcal conjugate vaccine 13-valent 6wks-17yrs; >50yrs    4. Encounter for immunization   - Pneumococcal conjugate vaccine 13-valent 6wks-17yrs; >50yrs        Medications Discontinued During This Encounter   Medication Reason     gabapentin (NEURONTIN) 300 MG capsule            Chief Complaint:  Chief Complaint   Patient presents with     Blood Pressure Check       Subjective:   Klesey Acevedo is a pleasant 63-year-old female presented to the clinic today for blood pressure check.  Patient is currently on hydrochlorothiazide, amlodipine and carvedilol.  She is doing well with these medications.  Blood pressures were slightly elevated but now they have returned to normal.  She states that even at her dentist office her blood pressure was good.  She is not having any chest pain or shortness of breath.  No lightheadedness.  She really has no questions or concerns regarding these today.    Otherwise.  Patient is due for her pneumonia vaccine as well as her single shingles shot.  She did have shingles several years ago but is never had the immunization.    12 point review of systems completed and negative except for what has been described above    Social History     Tobacco Use   Smoking Status Former Smoker   Smokeless Tobacco Never Used       Current Outpatient Medications   Medication Sig     amLODIPine (NORVASC) 5 MG tablet TAKE ONE TABLET BY MOUTH EVERY DAY     carvedilol (COREG) 12.5 MG tablet TAKE ONE TABLET BY MOUTH TWICE A DAY WITH MEALS     cholecalciferol, vitamin D3, (VITAMIN D3 ORAL) Take by mouth.     hydroCHLOROthiazide (HYDRODIURIL) 25 MG tablet TAKE ONE TABLET BY MOUTH EVERY DAY     potassium citrate  "(UROCIT-K) 10 mEq (1,080 mg) SR tablet Take 2 tablets (20 mEq total) by mouth 2 (two) times a day.         Objective:  Vitals:    07/19/19 1347   BP: 112/68   Pulse: 68   Resp: 12   Temp: 98.3  F (36.8  C)   TempSrc: Oral   Weight: 190 lb 8 oz (86.4 kg)   Height: 5' 2.5\" (1.588 m)       Body mass index is 34.29 kg/m .    Vital signs reviewed and stable  General: No acute distress  Psych: Appropriate affect  HEENT: moist mucous membranes  Lymph: no cervical or supraclavicular lymphadenopathy  Cardiovascular: regular rate and rhythm with no murmur  Pulmonary: clear to auscultation bilaterally with no wheeze  Abdomen: soft, non tender, non distended with normo-active bowel sounds  Extremities: warm and well perfused with no edema  Skin: warm and dry with no rash         This note has been dictated and transcribed using voice recognition software.   Any errors in transcription are unintentional and inherent to the software.  "

## 2021-05-30 NOTE — TELEPHONE ENCOUNTER
Refill Approved    Rx renewed per Medication Renewal Policy. Medication was last renewed on 1/8/19.    Livia Freire, [facility] Triage/Med Refill 7/9/2019     Requested Prescriptions   Pending Prescriptions Disp Refills     carvedilol (COREG) 12.5 MG tablet [Pharmacy Med Name: CARVEDILOL 12.5MG TABS] 180 tablet 1     Sig: TAKE ONE TABLET BY MOUTH TWICE A DAY WITH MEALS       Beta-Blockers Refill Protocol Passed - 7/9/2019 12:58 PM        Passed - PCP or prescribing provider visit in past 12 months or next 3 months     Last office visit with prescriber/PCP: 1/14/2019 Millie De Paz MD OR same dept: 1/14/2019 Millie De Paz MD OR same specialty: 1/14/2019 Millie De Paz MD  Last physical: 3/22/2019 Last MTM visit: Visit date not found   Next visit within 3 mo: Visit date not found  Next physical within 3 mo: Visit date not found  Prescriber OR PCP: Millie De Paz MD  Last diagnosis associated with med order: 1. HTN (hypertension)  - carvedilol (COREG) 12.5 MG tablet [Pharmacy Med Name: CARVEDILOL 12.5MG TABS]; TAKE ONE TABLET BY MOUTH TWICE A DAY WITH MEALS  Dispense: 180 tablet; Refill: 1  - hydroCHLOROthiazide (HYDRODIURIL) 25 MG tablet [Pharmacy Med Name: HYDROCHLOROTHIAZIDE 25MG TABS]; TAKE ONE TABLET BY MOUTH EVERY DAY  Dispense: 90 tablet; Refill: 1  - amLODIPine (NORVASC) 5 MG tablet [Pharmacy Med Name: AMLODIPINE BESYLATE 5MG TABS]; TAKE ONE TABLET BY MOUTH EVERY DAY  Dispense: 90 tablet; Refill: 1    If protocol passes may refill for 12 months if within 3 months of last provider visit (or a total of 15 months).             Passed - Blood pressure filed in past 12 months     BP Readings from Last 1 Encounters:   03/22/19 128/80             hydroCHLOROthiazide (HYDRODIURIL) 25 MG tablet [Pharmacy Med Name: HYDROCHLOROTHIAZIDE 25MG TABS] 90 tablet 1     Sig: TAKE ONE TABLET BY MOUTH EVERY DAY       Diuretics/Combination Diuretics Refill Protocol  Passed - 7/9/2019 12:58  PM        Passed - Visit with PCP or prescribing provider visit in past 12 months     Last office visit with prescriber/PCP: 1/14/2019 Millie De Paz MD OR same dept: 1/14/2019 Millie De Paz MD OR same specialty: 1/14/2019 Millie De Paz MD  Last physical: 3/22/2019 Last MTM visit: Visit date not found   Next visit within 3 mo: Visit date not found  Next physical within 3 mo: Visit date not found  Prescriber OR PCP: Millie De Paz MD  Last diagnosis associated with med order: 1. HTN (hypertension)  - carvedilol (COREG) 12.5 MG tablet [Pharmacy Med Name: CARVEDILOL 12.5MG TABS]; TAKE ONE TABLET BY MOUTH TWICE A DAY WITH MEALS  Dispense: 180 tablet; Refill: 1  - hydroCHLOROthiazide (HYDRODIURIL) 25 MG tablet [Pharmacy Med Name: HYDROCHLOROTHIAZIDE 25MG TABS]; TAKE ONE TABLET BY MOUTH EVERY DAY  Dispense: 90 tablet; Refill: 1  - amLODIPine (NORVASC) 5 MG tablet [Pharmacy Med Name: AMLODIPINE BESYLATE 5MG TABS]; TAKE ONE TABLET BY MOUTH EVERY DAY  Dispense: 90 tablet; Refill: 1    If protocol passes may refill for 12 months if within 3 months of last provider visit (or a total of 15 months).             Passed - Serum Potassium in past 12 months      Lab Results   Component Value Date    Potassium 3.7 03/26/2019             Passed - Serum Sodium in past 12 months      Lab Results   Component Value Date    Sodium 141 03/26/2019             Passed - Blood pressure on file in past 12 months     BP Readings from Last 1 Encounters:   03/22/19 128/80             Passed - Serum Creatinine in past 12 months      Creatinine   Date Value Ref Range Status   03/26/2019 0.66 0.60 - 1.10 mg/dL Final             amLODIPine (NORVASC) 5 MG tablet [Pharmacy Med Name: AMLODIPINE BESYLATE 5MG TABS] 90 tablet 1     Sig: TAKE ONE TABLET BY MOUTH EVERY DAY       Calcium-Channel Blockers Protocol Passed - 7/9/2019 12:58 PM        Passed - PCP or prescribing provider visit in past 12 months or next 3 months      Last office visit with prescriber/PCP: 1/14/2019 Millie De Paz MD OR same dept: 1/14/2019 Millie De Paz MD OR same specialty: 1/14/2019 Millie De Paz MD  Last physical: 3/22/2019 Last MTM visit: Visit date not found   Next visit within 3 mo: Visit date not found  Next physical within 3 mo: Visit date not found  Prescriber OR PCP: Millie De Paz MD  Last diagnosis associated with med order: 1. HTN (hypertension)  - carvedilol (COREG) 12.5 MG tablet [Pharmacy Med Name: CARVEDILOL 12.5MG TABS]; TAKE ONE TABLET BY MOUTH TWICE A DAY WITH MEALS  Dispense: 180 tablet; Refill: 1  - hydroCHLOROthiazide (HYDRODIURIL) 25 MG tablet [Pharmacy Med Name: HYDROCHLOROTHIAZIDE 25MG TABS]; TAKE ONE TABLET BY MOUTH EVERY DAY  Dispense: 90 tablet; Refill: 1  - amLODIPine (NORVASC) 5 MG tablet [Pharmacy Med Name: AMLODIPINE BESYLATE 5MG TABS]; TAKE ONE TABLET BY MOUTH EVERY DAY  Dispense: 90 tablet; Refill: 1    If protocol passes may refill for 12 months if within 3 months of last provider visit (or a total of 15 months).             Passed - Blood pressure filed in past 12 months     BP Readings from Last 1 Encounters:   03/22/19 128/80

## 2021-05-31 VITALS — WEIGHT: 189.25 LBS | HEIGHT: 63 IN | BODY MASS INDEX: 33.53 KG/M2

## 2021-06-01 VITALS — WEIGHT: 194.19 LBS | HEIGHT: 63 IN | BODY MASS INDEX: 34.41 KG/M2

## 2021-06-02 VITALS — HEIGHT: 63 IN | WEIGHT: 198.25 LBS | BODY MASS INDEX: 35.12 KG/M2

## 2021-06-02 VITALS — WEIGHT: 196.44 LBS | BODY MASS INDEX: 34.8 KG/M2 | HEIGHT: 63 IN

## 2021-06-02 NOTE — PROGRESS NOTES
Assessment/Plan:    Kelsey Acevedo is a 64 y.o. female presenting for:    1. Shortness of breath    2. Bochdalek hernia  The patient is actually doing much better.  I wonder if she had a small amount of fat necrosis in the hernia which caused inflammation and a small pleural effusion.  That this seems to have resolved almost completely.  Oxygen saturation is 97% on recheck today.  She has an appointment with the surgeon at the [facility] next week and will follow with him for likely repair of the hernia.  She will contact me if she needs anything for me.        There are no discontinued medications.        Chief Complaint:  Chief Complaint   Patient presents with     Hospital Visit Follow Up     [facility] F/U 10/14/19 SOB       Subjective:   Kelsey Acevedo is a very pleasant 64-year-old female presenting to the clinic today for follow-up of an ER visit.  Patient was seen in the emergency department on October 14 of this year for shortness of breath.  She had been feeling somewhat short of breath for a few days prior to the visit however on the day of the visit the shortness of breath intensified and she also had some pain in her back.  EKG was normal.  Troponin was normal.  D-dimer was slightly elevated.  A CT angiogram was performed which showed a small Bochdalek hernia.  The fat which extended through the hernia is edematous and potentially related to some associated fat necrosis.  The ER physician spoke with a surgeon who recommended that this would likely need to be repaired although not emergently.  She was told to take ibuprofen and she was taking Advil several times daily and her shortness of breath and pain have overall dissipated.  She has an appointment with the surgeon next week to discuss potentially repairing this.  [facility]    12 point review of systems completed and negative except for what has been described above    Social History     Tobacco Use   Smoking  "Status Former Smoker   Smokeless Tobacco Never Used       Current Outpatient Medications   Medication Sig     amLODIPine (NORVASC) 5 MG tablet TAKE ONE TABLET BY MOUTH EVERY DAY     carvedilol (COREG) 12.5 MG tablet TAKE ONE TABLET BY MOUTH TWICE A DAY WITH MEALS     cholecalciferol, vitamin D3, (VITAMIN D3 ORAL) Take by mouth.     hydroCHLOROthiazide (HYDRODIURIL) 25 MG tablet TAKE ONE TABLET BY MOUTH EVERY DAY     potassium citrate (UROCIT-K) 10 mEq (1,080 mg) SR tablet Take 2 tablets (20 mEq total) by mouth 2 (two) times a day.         Objective:  Vitals:    10/23/19 1024   BP: 122/78   Pulse: 68   Resp: 12   Temp: 98.2  F (36.8  C)   TempSrc: Oral   SpO2: 95%   Weight: 187 lb 8 oz (85 kg)   Height: 5' 2.5\" (1.588 m)       Body mass index is 33.75 kg/m .    Vital signs reviewed and stable  General: No acute distress  Psych: Appropriate affect  HEENT: moist mucous membranes, pupils equal, round, reactive to light and accomodation, posterior oropharynx clear of erythema or exudate, tympanic membranes are pearly grey bilaterally  Lymph: no cervical or supraclavicular lymphadenopathy  Cardiovascular: regular rate and rhythm with no murmur  Pulmonary: clear to auscultation bilaterally with no wheeze  Abdomen: soft, non tender, non distended with normo-active bowel sounds  Extremities: warm and well perfused with no edema  Skin: warm and dry with no rash         This note has been dictated and transcribed using voice recognition software.   Any errors in transcription are unintentional and inherent to the software.  "

## 2021-06-02 NOTE — TELEPHONE ENCOUNTER
Dr. Jose Ysuuf is scheduled with you for an EDF on 10/23/19 and the office note from 10/14/19 is in her chart.

## 2021-06-03 VITALS
BODY MASS INDEX: 33.22 KG/M2 | TEMPERATURE: 98.2 F | DIASTOLIC BLOOD PRESSURE: 78 MMHG | SYSTOLIC BLOOD PRESSURE: 122 MMHG | RESPIRATION RATE: 12 BRPM | HEIGHT: 63 IN | OXYGEN SATURATION: 95 % | HEART RATE: 68 BPM | WEIGHT: 187.5 LBS

## 2021-06-03 VITALS
HEIGHT: 63 IN | HEART RATE: 76 BPM | TEMPERATURE: 98.3 F | SYSTOLIC BLOOD PRESSURE: 110 MMHG | RESPIRATION RATE: 16 BRPM | BODY MASS INDEX: 33.87 KG/M2 | WEIGHT: 191.13 LBS | DIASTOLIC BLOOD PRESSURE: 64 MMHG

## 2021-06-03 VITALS — BODY MASS INDEX: 33.75 KG/M2 | WEIGHT: 190.5 LBS | HEIGHT: 63 IN

## 2021-06-04 VITALS
RESPIRATION RATE: 16 BRPM | HEIGHT: 63 IN | WEIGHT: 189.5 LBS | HEART RATE: 70 BPM | BODY MASS INDEX: 33.58 KG/M2 | TEMPERATURE: 96.4 F | SYSTOLIC BLOOD PRESSURE: 140 MMHG | DIASTOLIC BLOOD PRESSURE: 90 MMHG

## 2021-06-04 VITALS
WEIGHT: 190.25 LBS | SYSTOLIC BLOOD PRESSURE: 143 MMHG | DIASTOLIC BLOOD PRESSURE: 89 MMHG | TEMPERATURE: 97.4 F | BODY MASS INDEX: 33.71 KG/M2 | RESPIRATION RATE: 16 BRPM | HEIGHT: 63 IN | HEART RATE: 81 BPM

## 2021-06-04 NOTE — PROGRESS NOTES
Preoperative Exam    Scheduled Procedure: Hernia Surgery  Surgery Date:  1/10/20  Surgery Location: [facility]    Surgeon:  Dr. Bird Jose    Assessment/Plan:     1. Preoperative examination  - Basic Metabolic Panel  - HM2(CBC w/o Differential)    2. Morgagni hernia    3. Visit for screening mammogram  - Mammo Screening Bilateral; Future     Surgical Procedure Risk: Intermediate (reported cardiac risk generally 1-5%)  Have you had prior anesthesia?: Yes  Have you or any family members had a previous anesthesia reaction:  No  Do you or any family members have a history of a clotting or bleeding disorder?: No  Cardiac Risk Assessment: no increased risk for major cardiac complications    APPROVAL GIVEN to proceed with proposed procedure, without further diagnostic evaluation    Please Note:  The patient states that she has a history of borderline sleep apnea but does not wear a CPAP.    Functional Status: Independent  Patient plans to recover at home with family.     Subjective:      Kelsey Acevedo is a 64 y.o. female who presents for a preoperative consultation.  The patient was seen in the emergency department in mid October of this year for shortness of breath.  Work-up was unremarkable other than a CT scan which showed a diaphragmatic hernia.  This was thought to be the reason for her shortness of breath.  She has not seen a surgeon who diagnosed her with a Morgagni hernia.  This is a preoperative evaluation to have this repaired.  She continues to be symptomatic with increased coughing and some slight shortness of breath due to the hernia.    In addition to the indication for the surgery patient has a past medical history significant for hypertension which is well-controlled on medication.    All other systems reviewed and are negative, other than those listed in the HPI.    Pertinent History  Do you have difficulty breathing or chest pain after walking up a flight of stairs: No  History of  obstructive sleep apnea: Yes: Patient states that she has borderline sleep apnea but does not need a CPAP.  Steroid use in the last 6 months: Yes  Frequent Aspirin/NSAID use: No  Prior Blood Transfusion: No  Prior Blood Transfusion Reaction: No  If for some reason prior to, during or after the procedure, if it is medically indicated, would you be willing to have a blood transfusion?:  There is no transfusion refusal.    Current Outpatient Medications   Medication Sig Dispense Refill     amLODIPine (NORVASC) 5 MG tablet TAKE ONE TABLET BY MOUTH EVERY DAY 90 tablet 2     carvedilol (COREG) 12.5 MG tablet TAKE ONE TABLET BY MOUTH TWICE A DAY WITH MEALS 180 tablet 2     cholecalciferol, vitamin D3, (VITAMIN D3 ORAL) Take by mouth.       hydroCHLOROthiazide (HYDRODIURIL) 25 MG tablet TAKE ONE TABLET BY MOUTH EVERY DAY 90 tablet 2     potassium citrate (UROCIT-K) 10 mEq (1,080 mg) SR tablet Take 2 tablets (20 mEq total) by mouth 2 (two) times a day. 360 tablet 1     No current facility-administered medications for this visit.         Allergies   Allergen Reactions     Erythromycin Hives     Sulfa (Sulfonamide Antibiotics) Hives       Patient Active Problem List   Diagnosis     Diverticular disease of colon     Benign essential hypertension       Past Medical History:   Diagnosis Date     Bladder stone      Hypertension        Past Surgical History:   Procedure Laterality Date     bladder prolapse       BLADDER STONE REMOVAL       eyelid surgery       FOOT SURGERY       HYSTERECTOMY         Social History     Socioeconomic History     Marital status:      Spouse name: Not on file     Number of children: Not on file     Years of education: Not on file     Highest education level: Not on file   Occupational History     Not on file   Social Needs     Financial resource strain: Not on file     Food insecurity:     Worry: Not on file     Inability: Not on file     Transportation needs:     Medical: Not on file      "Non-medical: Not on file   Tobacco Use     Smoking status: Former Smoker     Smokeless tobacco: Never Used   Substance and Sexual Activity     Alcohol use: Not on file     Drug use: Not on file     Sexual activity: Not on file   Lifestyle     Physical activity:     Days per week: Not on file     Minutes per session: Not on file     Stress: Not on file   Relationships     Social connections:     Talks on phone: Not on file     Gets together: Not on file     Attends Restoration service: Not on file     Active member of club or organization: Not on file     Attends meetings of clubs or organizations: Not on file     Relationship status: Not on file     Intimate partner violence:     Fear of current or ex partner: Not on file     Emotionally abused: Not on file     Physically abused: Not on file     Forced sexual activity: Not on file   Other Topics Concern     Not on file   Social History Narrative     Not on file       Patient Care Team:  Millie De Paz MD as PCP - General (Family Medicine)  Millie De Paz MD as Assigned PCP          Objective:     Vitals:    12/13/19 1308   BP: 110/64   Pulse: 76   Resp: 16   Temp: 98.3  F (36.8  C)   TempSrc: Oral   Weight: 191 lb 2 oz (86.7 kg)   Height: 5' 2.5\" (1.588 m)         Physical Exam:  Objective:  Vital signs reviewed and stable  General: No acute distress  Psych: Appropriate affect  HEENT: moist mucous membranes, pupils equal, round, reactive to light and accomodation, posterior oropharynx clear of erythema or exudate, tympanic membranes are pearly grey bilaterally  Lymph: no cervical or supraclavicular lymphadenopathy  Cardiovascular: regular rate and rhythm with no murmur  Pulmonary: clear to auscultation bilaterally with no wheeze  Abdomen: soft, non tender, non distended with normo-active bowel sounds  Extremities: warm and well perfused with no edema  Skin: warm and dry with no rash      There are no Patient Instructions on file for this " visit.    EKG: Done on October 14th 2019 when she was in the emergency room.  Normal sinus rhythm with a rate of 69 bpm.  Normal EKG.    Labs:  Recent Results (from the past 48 hour(s))   Basic Metabolic Panel    Collection Time: 12/13/19  1:31 PM   Result Value Ref Range    Sodium 141 136 - 145 mmol/L    Potassium 3.4 (L) 3.5 - 5.0 mmol/L    Chloride 100 98 - 107 mmol/L    CO2 32 (H) 22 - 31 mmol/L    Anion Gap, Calculation 9 5 - 18 mmol/L    Glucose 102 70 - 125 mg/dL    Calcium 9.6 8.5 - 10.5 mg/dL    BUN 9 8 - 22 mg/dL    Creatinine 0.70 0.60 - 1.10 mg/dL    GFR MDRD Af Amer >60 >60 mL/min/1.73m2    GFR MDRD Non Af Amer >60 >60 mL/min/1.73m2   HM2(CBC w/o Differential)    Collection Time: 12/13/19  1:31 PM   Result Value Ref Range    WBC 6.6 4.0 - 11.0 thou/uL    RBC 4.47 3.80 - 5.40 mill/uL    Hemoglobin 13.5 12.0 - 16.0 g/dL    Hematocrit 39.7 35.0 - 47.0 %    MCV 89 80 - 100 fL    MCH 30.2 27.0 - 34.0 pg    MCHC 34.0 32.0 - 36.0 g/dL    RDW 11.8 11.0 - 14.5 %    Platelets 238 140 - 440 thou/uL    MPV 7.6 7.0 - 10.0 fL        Immunization History   Administered Date(s) Administered     DT (pediatric) 01/01/2001     Influenza [id number], 12/04/2009     Influenza high dose,seasonal,PF, 65+ yrs 09/27/2018, 10/23/2019     Influenza, Seasonal, Inj PF IIV3 02/09/2016     Influenza, inj, historic,unspecified 11/28/2006, 09/18/2009, 09/28/2011, 11/05/2012, 02/14/2014     Influenza,inj,MDCK,PF,Quad >4yrs 01/19/2018     Influenza,seasonal quad, PF, =/> 6months 02/03/2017     Pneumo Conj 13-V (2010&after) 07/19/2019     Tdap 10/06/2017           Electronically signed by Millie De Paz MD 12/15/19 1:12 PM

## 2021-06-07 NOTE — TELEPHONE ENCOUNTER
Dr. De Paz-  See pt's [employer] message and reply via [employer].  Wait it out longer? Telephone visit?

## 2021-06-07 NOTE — TELEPHONE ENCOUNTER
RN cannot approve Refill Request    RN can NOT refill this medication med is not covered by policy/route to provider     . Last office visit: 10/23/2019 Millie De Paz MD Last Physical: 12/13/2019 Last MTM visit: Visit date not found Last visit same specialty: 10/23/2019 Millie De Paz MD.  Next visit within 3 mo: Visit date not found  Next physical within 3 mo: Visit date not found      Livia Freire, [facility] Triage/Med Refill 3/25/2020    Requested Prescriptions   Pending Prescriptions Disp Refills     potassium citrate (UROCIT-K) 10 mEq (1,080 mg) SR tablet [Pharmacy Med Name: Potassium Citrate ER Oral Tablet Extended Release 10 MEQ (1080 MG)] 360 tablet 3     Sig: TAKE TWO TABLETS BY MOUTH TWICE DAILY       There is no refill protocol information for this order

## 2021-06-07 NOTE — TELEPHONE ENCOUNTER
Refill Approved    Rx renewed per Medication Renewal Policy. Medication was last renewed on 7/9/19.    Livia Freire, [facility] Triage/Med Refill 3/31/2020     Requested Prescriptions   Pending Prescriptions Disp Refills     hydroCHLOROthiazide (HYDRODIURIL) 25 MG tablet [Pharmacy Med Name: HYDROCHLOROTHIAZIDE 25MG TABS] 90 tablet 2     Sig: TAKE ONE TABLET BY MOUTH EVERY DAY       Diuretics/Combination Diuretics Refill Protocol  Passed - 3/29/2020  6:27 PM        Passed - Visit with PCP or prescribing provider visit in past 12 months     Last office visit with prescriber/PCP: 10/23/2019 Millie De Paz MD OR same dept: 10/23/2019 Millie De Paz MD OR same specialty: 10/23/2019 Millie De Paz MD  Last physical: 12/13/2019 Last MTM visit: Visit date not found   Next visit within 3 mo: Visit date not found  Next physical within 3 mo: Visit date not found  Prescriber OR PCP: Millie De Paz MD  Last diagnosis associated with med order: 1. HTN (hypertension)  - hydroCHLOROthiazide (HYDRODIURIL) 25 MG tablet [Pharmacy Med Name: HYDROCHLOROTHIAZIDE 25MG TABS]; TAKE ONE TABLET BY MOUTH EVERY DAY  Dispense: 90 tablet; Refill: 2  - carvediloL (COREG) 12.5 MG tablet [Pharmacy Med Name: CARVEDILOL 12.5MG TABS]; TAKE ONE TABLET BY MOUTH TWICE A DAY WITH MEALS  Dispense: 180 tablet; Refill: 2  - amLODIPine (NORVASC) 5 MG tablet [Pharmacy Med Name: AMLODIPINE BESYLATE 5MG TABS]; TAKE ONE TABLET BY MOUTH EVERY DAY  Dispense: 90 tablet; Refill: 2    If protocol passes may refill for 12 months if within 3 months of last provider visit (or a total of 15 months).             Passed - Serum Potassium in past 12 months      Lab Results   Component Value Date    Potassium 3.4 (L) 12/13/2019             Passed - Serum Sodium in past 12 months      Lab Results   Component Value Date    Sodium 141 12/13/2019             Passed - Blood pressure on file in past 12 months     BP Readings from Last 1  Encounters:   12/13/19 110/64             Passed - Serum Creatinine in past 12 months      Creatinine   Date Value Ref Range Status   12/13/2019 0.70 0.60 - 1.10 mg/dL Final                carvediloL (COREG) 12.5 MG tablet [Pharmacy Med Name: CARVEDILOL 12.5MG TABS] 180 tablet 2     Sig: TAKE ONE TABLET BY MOUTH TWICE A DAY WITH MEALS       Beta-Blockers Refill Protocol Passed - 3/29/2020  6:27 PM        Passed - PCP or prescribing provider visit in past 12 months or next 3 months     Last office visit with prescriber/PCP: 10/23/2019 Millie De Paz MD OR same dept: 10/23/2019 Millie De Paz MD OR same specialty: 10/23/2019 Millie De Paz MD  Last physical: 12/13/2019 Last MTM visit: Visit date not found   Next visit within 3 mo: Visit date not found  Next physical within 3 mo: Visit date not found  Prescriber OR PCP: Millie De Paz MD  Last diagnosis associated with med order: 1. HTN (hypertension)  - hydroCHLOROthiazide (HYDRODIURIL) 25 MG tablet [Pharmacy Med Name: HYDROCHLOROTHIAZIDE 25MG TABS]; TAKE ONE TABLET BY MOUTH EVERY DAY  Dispense: 90 tablet; Refill: 2  - carvediloL (COREG) 12.5 MG tablet [Pharmacy Med Name: CARVEDILOL 12.5MG TABS]; TAKE ONE TABLET BY MOUTH TWICE A DAY WITH MEALS  Dispense: 180 tablet; Refill: 2  - amLODIPine (NORVASC) 5 MG tablet [Pharmacy Med Name: AMLODIPINE BESYLATE 5MG TABS]; TAKE ONE TABLET BY MOUTH EVERY DAY  Dispense: 90 tablet; Refill: 2    If protocol passes may refill for 12 months if within 3 months of last provider visit (or a total of 15 months).             Passed - Blood pressure filed in past 12 months     BP Readings from Last 1 Encounters:   12/13/19 110/64                amLODIPine (NORVASC) 5 MG tablet [Pharmacy Med Name: AMLODIPINE BESYLATE 5MG TABS] 90 tablet 2     Sig: TAKE ONE TABLET BY MOUTH EVERY DAY       Calcium-Channel Blockers Protocol Passed - 3/29/2020  6:27 PM        Passed - PCP or prescribing provider visit in past 12  months or next 3 months     Last office visit with prescriber/PCP: 10/23/2019 Millie De Paz MD OR same dept: 10/23/2019 Millie De Paz MD OR same specialty: 10/23/2019 Millie De Paz MD  Last physical: 12/13/2019 Last MTM visit: Visit date not found   Next visit within 3 mo: Visit date not found  Next physical within 3 mo: Visit date not found  Prescriber OR PCP: Millie De Paz MD  Last diagnosis associated with med order: 1. HTN (hypertension)  - hydroCHLOROthiazide (HYDRODIURIL) 25 MG tablet [Pharmacy Med Name: HYDROCHLOROTHIAZIDE 25MG TABS]; TAKE ONE TABLET BY MOUTH EVERY DAY  Dispense: 90 tablet; Refill: 2  - carvediloL (COREG) 12.5 MG tablet [Pharmacy Med Name: CARVEDILOL 12.5MG TABS]; TAKE ONE TABLET BY MOUTH TWICE A DAY WITH MEALS  Dispense: 180 tablet; Refill: 2  - amLODIPine (NORVASC) 5 MG tablet [Pharmacy Med Name: AMLODIPINE BESYLATE 5MG TABS]; TAKE ONE TABLET BY MOUTH EVERY DAY  Dispense: 90 tablet; Refill: 2    If protocol passes may refill for 12 months if within 3 months of last provider visit (or a total of 15 months).             Passed - Blood pressure filed in past 12 months     BP Readings from Last 1 Encounters:   12/13/19 110/64

## 2021-06-10 NOTE — PROGRESS NOTES
Assessment/Plan:    Kelsey Acevedo is a 61 y.o. female presenting for:    1. Dysuria  Urine analysis appears concerning for a infection at this point.  We will send an antibiotic to the pharmacy.  Discussed risks and benefits of the antibiotic as well as most common side effects.  I encouraged her to keep her appointment with the urologist for evaluation of the bladder stones.  If she continues to have issues with dysuria I would request that she come back to clinic to leave a repeat urine analysis next week once she is done with antibiotics.  If she continues to have blood in her urine it might be worth doing a CT scan to evaluate for stones in her kidneys/ureters/bladder.  - Urinalysis-UC if Indicated  - Culture, Urine  - nitrofurantoin, macrocrystal-monohydrate, (MACROBID) 100 MG capsule; Take 1 capsule (100 mg total) by mouth 2 (two) times a day for 7 days.  Dispense: 14 capsule; Refill: 0        There are no discontinued medications.        Chief Complaint:  Chief Complaint   Patient presents with     Cystitis     Stabbing pain, burning, blood, frequency and urgency- hx of bladder stones        Subjective:   Kelsey Acevedo is a pleasant 61-year-old female presenting to the clinic today with concerns over a possible urinary tract infection.  The patient states that several years back she had bladder stones.  She believes that they were removed but cannot remember exactly how.  She was doing well until about 2 months ago when she started to have some hesitancy with urination particularly in the morning.  She also notes some sharp pain centrally in the pelvic area particularly with urination.  She has had some pain and burning with urination as well as some blood in her urine yesterday.  She states her bowel movements are normal.  She has not been nauseated.  No flank pain or fevers.  No other systemic symptoms.    12 point review of systems completed and negative except for what has been described  "above    History   Smoking Status     Former Smoker   Smokeless Tobacco     Not on file       Current Outpatient Prescriptions   Medication Sig Note     amLODIPine (NORVASC) 5 MG tablet TAKE ONE TABLET BY MOUTH ONE TIME DAILY      aspirin 81 MG EC tablet Take 81 mg by mouth. 8/24/2016: [facility]: [facility]     CALCIUM CARBONATE/VITAMIN D3 (CALTRATE 600 + D ORAL) Take by mouth.      carvedilol (COREG) 12.5 MG tablet Take 1 tablet (12.5 mg total) by mouth 2 (two) times a day with meals.      multivitamin (ONE A DAY) per tablet Take 1 tablet by mouth. 8/24/2016: Received from: [facility]     nitrofurantoin, macrocrystal-monohydrate, (MACROBID) 100 MG capsule Take 1 capsule (100 mg total) by mouth 2 (two) times a day for 7 days.          Objective:  Vitals:    05/08/17 0852   BP: 120/82   Pulse: 68   Resp: 12   Temp: 98.3  F (36.8  C)   TempSrc: Oral   Weight: 187 lb 4 oz (84.9 kg)   Height: 5' 2.75\" (1.594 m)       Vital signs reviewed and stable  General: No acute distress  Psych: Appropriate affect  HEENT: moist mucous membranes, pupils equal, round, reactive to light and accomodation, posterior oropharynx clear of erythema or exudate, tympanic membranes are pearly grey bilaterally  Lymph: no cervical or supraclavicular lymphadenopathy  Cardiovascular: regular rate and rhythm with no murmur  Pulmonary: clear to auscultation bilaterally with no wheeze  Abdomen: soft, non tender, non distended with normo-active bowel sounds  Extremities: warm and well perfused with no edema  Skin: warm and dry with no rash         This note has been dictated and transcribed using voice recognition software.   Any errors in transcription are unintentional and inherent to the software.  "

## 2021-06-11 NOTE — PROGRESS NOTES
Assessment/Plan:      Visit for Preoperative Exam.     Patient approved for surgery with general or local anesthesia. Follow up as needed. Proceed with proposed surgery without additional clinical clarifications.     Subjective:     Scheduled Procedure: Bladder Stone Removal  Surgery Date:  6/9/17  Surgery Location:  [facility]  Surgeon:  Dr. Yoshi Marian is an extremely pleasant 61-year-old female with a past medical history significant for hypertension presenting to the clinic today for preoperative evaluation for a bladder stone removal.  According to her report she has a 1 inch stone in her bladder which need to be broken up and removed.    Current Outpatient Prescriptions   Medication Sig Dispense Refill     amLODIPine (NORVASC) 5 MG tablet TAKE ONE TABLET BY MOUTH ONE TIME DAILY 30 tablet 10     aspirin 81 MG EC tablet Take 81 mg by mouth.       CALCIUM CARBONATE/VITAMIN D3 (CALTRATE 600 + D ORAL) Take by mouth.       carvedilol (COREG) 12.5 MG tablet Take 1 tablet (12.5 mg total) by mouth 2 (two) times a day with meals. 180 tablet 3     multivitamin (ONE A DAY) per tablet Take 1 tablet by mouth.       No current facility-administered medications for this visit.        Allergies   Allergen Reactions     Erythromycin Hives     Sulfa (Sulfonamide Antibiotics) Hives       Immunization History   Administered Date(s) Administered     DT (pediatric) 01/01/2001     Influenza, inj, historic 11/28/2006, 09/18/2009, 09/28/2011, 11/05/2012, 02/14/2014       There is no problem list on file for this patient.      Past Medical History:   Diagnosis Date     Bladder stone      Hypertension        Family History   Problem Relation Age of Onset     Hypertension Mother      Cancer Mother      bladder     Diabetes Father      Heart disease Father        Social History     Social History     Marital status:      Spouse name: N/A     Number of children: N/A     Years of education: N/A     Occupational History      Not on file.     Social History Main Topics     Smoking status: Former Smoker     Smokeless tobacco: Not on file     Alcohol use Not on file     Drug use: Not on file     Sexual activity: Not on file     Other Topics Concern     Not on file     Social History Narrative       Past Surgical History:   Procedure Laterality Date     bladder prolapse       BLADDER STONE REMOVAL       FOOT SURGERY       HYSTERECTOMY         History of Present Illness  Recent Health  Fever: no  Chills: no  Fatigue: no  Chest Pain: no  Cough: no  Dyspnea: no  Urinary Frequency: no  Nausea: no  Vomiting: no  Diarrhea: no  Abdominal Pain: no  Easy Bruising: no  Lower Extremity Swelling: no  Poor Exercise Tolerance: no    Most recent Health Maintenance Visit:  4 month(s) ago    Pertinent History  Prior Anesthesia: yes  Previous Anesthesia Reaction:  no  Diabetes: no  Cardiovascular Disease: Well-controlled hypertension   pulmonary Disease: no  Renal Disease: no  GI Disease: no  Sleep Apnea: no  Thromboembolic Problems: no  Clotting Disorder: no  Bleeding Disorder: no  Transfusion Reaction: no  Impaired Immunity: no  Steroid use in the last 6 months: none  Frequent Aspirin use: no    Family history: See above - no family Hx of easy bruising/bleeding or anesthesia reactions    Social history of there is no transfusion refusal and there are no concerns regarding care after surgery    After surgery, the patient plans to recover at home with family.    Review of Systems  Pertinent items are noted in HPI.  Constitutional: negative  Eyes: negative  Ears, nose, mouth, throat, and face: negative  Respiratory: negative  Cardiovascular: negative  Gastrointestinal: negative  Genitourinary:negative  Integument/breast: negative  Hematologic/lymphatic: negative  Musculoskeletal:negative  Neurological: negative  Behavioral/Psych: negative  Endocrine: negative  Allergic/Immunologic: negative          Objective:         Vitals:    06/07/17 1317   BP: 114/72  "  Pulse: 68   Resp: 16   Temp: 98  F (36.7  C)   TempSrc: Oral   Weight: 188 lb 8 oz (85.5 kg)   Height: 5' 3.25\" (1.607 m)       Vital signs reviewed and stable  General: No acute distress  Psych: Appropriate affect  HEENT: moist mucous membranes, pupils equal, round, reactive to light and accomodation, posterior oropharynx clear of erythema or exudate, tympanic membranes are pearly grey bilaterally  Lymph: no cervical or supraclavicular lymphadenopathy  Cardiovascular: regular rate and rhythm with no murmur  Pulmonary: clear to auscultation bilaterally with no wheeze  Abdomen: soft, non tender, non distended with normo-active bowel sounds  Extremities: warm and well perfused with no edema  Skin: warm and dry with no rash       Recent Results (from the past 240 hour(s))   Electrocardiogram Perform and Read   Result Value Ref Range    SYSTOLIC BLOOD PRESSURE  mmHg    DIASTOLIC BLOOD PRESSURE  mmHg    VENTRICULAR RATE 64 BPM    ATRIAL RATE 64 BPM    P-R INTERVAL 196 ms    QRS DURATION 98 ms    Q-T INTERVAL 430 ms    QTC CALCULATION (BEZET) 443 ms    P Axis 22 degrees    R AXIS 2 degrees    T AXIS 20 degrees    MUSE DIAGNOSIS       Normal sinus rhythm  Normal ECG  No previous ECGs available  Confirmed by FRANCO LAIRD MD LOC:[facility] ([id number]) on 6/7/2017 2:03:22 PM     Basic Metabolic Panel   Result Value Ref Range    Sodium 142 136 - 145 mmol/L    Potassium 3.7 3.5 - 5.0 mmol/L    Chloride 104 98 - 107 mmol/L    CO2 30 22 - 31 mmol/L    Anion Gap, Calculation 8 5 - 18 mmol/L    Glucose 95 70 - 125 mg/dL    Calcium 9.3 8.5 - 10.5 mg/dL    BUN 10 8 - 22 mg/dL    Creatinine 0.71 0.60 - 1.10 mg/dL    GFR MDRD Af Amer >60 >60 mL/min/1.73m2    GFR MDRD Non Af Amer >60 >60 mL/min/1.73m2   Hemoglobin   Result Value Ref Range    Hemoglobin 13.5 12.0 - 16.0 g/dL       "

## 2021-06-12 NOTE — PROGRESS NOTES
[facility]  [address] [facility] [address]  Dept: [phone]  Dept Fax: [phone]  Primary Provider: Millie De Paz MD  Pre-op Performing Provider: MILLIE DE PAZ    PREOPERATIVE EVALUATION:  Today's date: 10/19/2020    Kelsey Acevedo is a 65 y.o. female who presents for a preoperative evaluation.    Surgical Information:  Surgery/Procedure: Microdisectomy  Surgery Location: [facility]  Surgeon: Dr. Wilder Mcknight  Surgery Date: 10/21/20  Time of Surgery: TBD  Where patient plans to recover: At home with family  Fax number for surgical facility: [phone]    Type of Anesthesia Anticipated: General    Subjective     HPI related to upcoming procedure: Kelsey is a very pleasant 65-year-old female with a past medical history significant for hypertension presenting to the clinic today for a preoperative examination.  She has been unfortunately having fairly severe back pain with there is radiating down her legs.  She will be having a microdiscectomy.    Preop Questions 10/17/2020   Have you ever had a heart attack or stroke? No   Have you ever had surgery on your heart or blood vessels, such as a stent placement, a coronary artery bypass, or surgery on an artery in your head, neck, heart, or legs? No   Do you have chest pain with activity? No   Do you have a history of  heart failure? No   Do you currently have a cold, bronchitis or symptoms of other infection? No   Do you have a cough, shortness of breath, or wheezing? UNKNOWN - maybe wheezing occasionally - not currently   Do you or anyone in your family have previous history of blood clots? No   Do you or does anyone in your family have a serious bleeding problem such as prolonged bleeding following surgeries or cuts? No   Have you ever had problems with anemia or been told to take iron pills? No   Have you had any abnormal blood loss such as black, tarry or bloody stools, or abnormal vaginal  bleeding? No   Have you ever had a blood transfusion? No   Are you willing to have a blood transfusion if it is medically needed before, during, or after your surgery? Yes   Have you or any of your relatives ever had problems with anesthesia? No   Do you have sleep apnea, excessive snoring or daytime drowsiness? YES - mild sleep apnea   Do you have a CPAP machine? No   Do you have any artifical heart valves or other implanted medical devices like a pacemaker, defibrillator, or continuous glucose monitor? No   Do you have artificial joints? No   Are you allergic to latex? No     Health Care Directive:  Patient does not have a Health Care Directive or Living Will: Discussed advance care planning with patient; however, patient declined at this time.    Preoperative Review of :    reviewed - no record of controlled substances prescribed.    HYPERTENSION - Patient has longstanding history of HTN , currently denies any symptoms referable to elevated blood pressure. Specifically denies chest pain, palpitations, dyspnea, orthopnea, PND or peripheral edema. Blood pressure readings have been in normal range. Current medication regimen is as listed below. Patient denies any side effects of medication.       Review of Systems  Constitutional, neuro, ENT, endocrine, pulmonary, cardiac, gastrointestinal, genitourinary, musculoskeletal, integument and psychiatric systems are negative, except as otherwise noted.      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 1 each 0     amLODIPine (NORVASC) 5 MG tablet TAKE ONE TABLET BY MOUTH EVERY DAY 90 tablet 2     carvediloL (COREG) 12.5 MG tablet TAKE ONE TABLET BY MOUTH TWICE A DAY WITH MEALS 180 tablet 2     cholecalciferol, vitamin D3, (VITAMIN D3 ORAL) Take by mouth.       hydroCHLOROthiazide (HYDRODIURIL) 25 MG tablet TAKE ONE TABLET BY MOUTH EVERY DAY 90 tablet 2     potassium citrate (UROCIT-K) 10 mEq (1,080 mg) SR tablet TAKE TWO TABLETS BY MOUTH TWICE DAILY  360 tablet 1     No current facility-administered medications for this visit.        Allergies   Allergen Reactions     Erythromycin Hives     Sulfa (Sulfonamide Antibiotics) Hives       Social History     Tobacco Use     Smoking status: Former Smoker     Smokeless tobacco: Never Used   Substance Use Topics     Alcohol use: Not on file      Family History   Problem Relation Age of Onset     Hypertension Mother      Cancer Mother         bladder     Diabetes Father      Heart disease Father      Social History     Substance and Sexual Activity   Drug Use Not on file        Objective     /89   Pulse 81   Temp 97.4  F (36.3  C) (Oral)   Resp 16   Ht 5' 2.5\" (1.588 m)   Wt 190 lb 4 oz (86.3 kg)   Breastfeeding No   BMI 34.24 kg/m    Physical Exam  Objective:  Vital signs reviewed and stable  General: No acute distress  Psych: Appropriate affect  HEENT: moist mucous membranes, pupils equal, round, reactive to light and accomodation, posterior oropharynx clear of erythema or exudate, tympanic membranes are pearly grey bilaterally  Lymph: no cervical or supraclavicular lymphadenopathy  Cardiovascular: regular rate and rhythm with no murmur  Pulmonary: clear to auscultation bilaterally with no wheeze  Abdomen: soft, non tender, non distended with normo-active bowel sounds  Extremities: warm and well perfused with no edema  Skin: warm and dry with " no rash      Recent Labs   Lab Test 12/13/19  1331 07/19/19  1425 03/22/19  1135 03/22/19  1135   HGB 13.5  --   --  13.8     --   --  233    142   < > 144   K 3.4* 3.0*   < > 2.8*   CREATININE 0.70 0.66   < > 0.72    < > = values in this interval not displayed.        PRE-OP Diagnostics:   Recent Results (from the past 168 hour(s))   Basic Metabolic Panel    Collection Time: 10/19/20 10:17 AM   Result Value Ref Range    Sodium 142 136 - 145 mmol/L    Potassium 3.4 (L) 3.5 - 5.0 mmol/L    Chloride 101 98 - 107 mmol/L    CO2 29 22 - 31 mmol/L    Anion Gap, Calculation 12 5 - 18 mmol/L    Glucose 98 70 - 125 mg/dL    Calcium 9.3 8.5 - 10.5 mg/dL    BUN 10 8 - 22 mg/dL    Creatinine 0.66 0.60 - 1.10 mg/dL    GFR MDRD Af Amer >60 >60 mL/min/1.73m2    GFR MDRD Non Af Amer >60 >60 mL/min/1.73m2   HM2(CBC w/o Differential)    Collection Time: 10/19/20 10:17 AM   Result Value Ref Range    WBC 6.5 4.0 - 11.0 thou/uL    RBC 4.76 3.80 - 5.40 mill/uL    Hemoglobin 14.5 12.0 - 16.0 g/dL    Hematocrit 42.6 35.0 - 47.0 %    MCV 89 80 - 100 fL    MCH 30.4 27.0 - 34.0 pg    MCHC 34.0 32.0 - 36.0 g/dL    RDW 12.3 11.0 - 14.5 %    Platelets 222 140 - 440 thou/uL    MPV 7.0 7.0 - 10.0 fL   Electrocardiogram Perform and Read    Collection Time: 10/19/20  3:58 PM   Result Value Ref Range    SYSTOLIC BLOOD PRESSURE      DIASTOLIC BLOOD PRESSURE      VENTRICULAR RATE 77 BPM    ATRIAL RATE 77 BPM    P-R INTERVAL 208 ms    QRS DURATION 90 ms    Q-T INTERVAL 388 ms    QTC CALCULATION (BEZET) 439 ms    P Axis 56 degrees    R AXIS -1 degrees    T AXIS 7 degrees    MUSE DIAGNOSIS       Normal sinus rhythm  Possible Anterior infarct , age undetermined  Abnormal ECG  When compared with ECG of 22-MAR-2019 11:15,  Nonspecific T wave abnormality, worse in Inferior leads  Confirmed by ALEX TURNER, LES LOC:WW ([id number]) on 10/19/2020 1:33:37 PM         REVISED CARDIAC RISK INDEX (RCRI)   The patient has the following serious cardiovascular  risks for perioperative complications:   - No serious cardiac risks = 0 points    RCRI INTERPRETATION: 0 points: Class I (very low risk - 0.4% complication rate)         Assessment & Plan      The proposed surgical procedure is considered INTERMEDIATE risk.    1. Preoperative examination  Electrocardiogram Perform and Read    Basic Metabolic Panel    Asymptomatic COVID-19 Virus (CORONAVIRUS) PCR    HM2(CBC w/o Differential)    CANCELED: Hemoglobin   2. Chronic bilateral low back pain with left-sided sciatica       Possible Sleep Apnea: patient does not use CPAP - discussed having re-evaluation       Risks and Recommendations:  The patient has the following additional risks and recommendations for perioperative complications:   - No identified additional risk factors other than previously addressed    Medication Instructions:  Patient is to take all scheduled medications on the day of surgery EXCEPT for modifications listed below:   Hold hydrochlorothiazide morning of surgery    RECOMMENDATION:  APPROVAL GIVEN to proceed with proposed procedure, without further diagnostic evaluation.    PENDING COVID TESTING    Signed Electronically by: Millie De Paz MD    Copy of this evaluation report is provided to requesting physician.    Preop [facility] Preop Guidelines    Revised Cardiac Risk Index

## 2021-06-13 NOTE — PROGRESS NOTES
"    Assessment/Plan:     Health maintenance female exam.  All questions answered.  PAP UTD  Breast self exam technique reviewed and patient encouraged to perform self-exam monthly.  Discussed healthy lifestyle modifications.  Mammogram ordered.  Labs 3 months ago were normal including a BMP.  The following high BMI interventions were performed this visit: encouragement to exercise and weight monitoring    1. Hypertension  Good blood pressure control today.   - carvedilol (COREG) 12.5 MG tablet; TAKE ONE TABLET (12.5MG) BY MOUTH TWICE A DAY WITH MEALS  Dispense: 180 tablet; Refill: 1  - amLODIPine (NORVASC) 5 MG tablet; TAKE ONE TABLET BY MOUTH ONE TIME DAILY  Dispense: 90 tablet; Refill: 1    2. Healthcare maintenance  - Mammo Screening Bilateral; Future  - Tdap vaccine,  8yo or older,  IM    3. Dizziness  The patient is very concerned about her intermittent dizziness.  I have actually seen her phone of these episodes and diagnosed her with benign paroxysmal positional vertigo.  She did not have any concerning neurologic exam finding at that time.  She would like to see neurology to discuss this further.  I did discuss that there is likely not much that they will be able to do other than have her see physical therapy for vestibular purposes.  I have offered to give her that referral but she refuses and would like to see a neurologist.  Referral was placed today.  - Ambulatory referral to Neurology          Subjective:      Kelsey Acevedo is a 62 y.o. female who presents for an annual exam.  She is overall doing well.  Just a few concerns that she would like to discuss today.    Mainly she asks \"how do I know if I have ever had a stroke.\"  She has a bit of a difficult time extrapolating upon this question.  She had a few levels of benign paroxysmal positional vertigo which tend to onrah on their own.  I actually saw her one time for this and had referred her to vestibular physical therapy however she was never seen " "as her symptoms improved prior to her making appointment.  She states that she can be forgetful at times but cannot give me any specific examples.  She notes that sometimes she will walk into a room and not remember why she entered the room.  She states that she does not get lost while driving or forget people's names.  She does not note any focal neurologic deficit.  She states that her kids wanted her to \"be evaluated for a stroke in the past.\"     Healthy Habits:   Regular Exercise: Yes  Sunscreen Use: Yes  Healthy Diet: Yes  Dental Visits Regularly: Yes  Seat Belt: Yes  Sexually active: No  Self Breast Exam Monthly:Yes  Colonoscopy: Yes  Prevention of Osteoporosis: Yes  Last Dexa: N/A    Immunization History   Administered Date(s) Administered     DT (pediatric) 01/01/2001     Influenza, inj, historic 11/28/2006, 09/18/2009, 09/28/2011, 11/05/2012, 02/14/2014     Tdap 10/06/2017     Immunization status: up to date and documented.    Gynecologic History  No LMP recorded. Patient has had a hysterectomy.  Contraception: post menopausal status  Last mammogram: 2015. Results were: normal      OB History   No data available       Current Outpatient Prescriptions   Medication Sig Dispense Refill     amLODIPine (NORVASC) 5 MG tablet TAKE ONE TABLET BY MOUTH ONE TIME DAILY 90 tablet 1     aspirin 81 MG EC tablet Take 81 mg by mouth.       CALCIUM CARBONATE/VITAMIN D3 (CALTRATE 600 + D ORAL) Take by mouth.       carvedilol (COREG) 12.5 MG tablet TAKE ONE TABLET (12.5MG) BY MOUTH TWICE A DAY WITH MEALS 180 tablet 1     hydroCHLOROthiazide (HYDRODIURIL) 25 MG tablet   1     potassium citrate (UROCIT-K) 10 mEq (1,080 mg) SR tablet   1     No current facility-administered medications for this visit.      Past Medical History:   Diagnosis Date     Bladder stone      Hypertension      Past Surgical History:   Procedure Laterality Date     bladder prolapse       BLADDER STONE REMOVAL       FOOT SURGERY       HYSTERECTOMY   " "    Erythromycin and Sulfa (sulfonamide antibiotics)  Family History   Problem Relation Age of Onset     Hypertension Mother      Cancer Mother      bladder     Diabetes Father      Heart disease Father      Social History     Social History     Marital status:      Spouse name: N/A     Number of children: N/A     Years of education: N/A     Occupational History     Not on file.     Social History Main Topics     Smoking status: Former Smoker     Smokeless tobacco: Not on file     Alcohol use Not on file     Drug use: Not on file     Sexual activity: Not on file     Other Topics Concern     Not on file     Social History Narrative       Review of Systems  General:  Denies problems  Eyes:  Denies problems  Ears/Nose/Throat:  Denies problems  Cardiovascular:  Denies problems  Respiratory:  Denies problems  Gastrointestinal:  Denies problems  Genitourinary:  Denies problems  Musculoskeletal:  Denies problems  Skin:  Denies problems, Neurologic:  Denies problems  Psychiatric:  Denies problems  Endocrine:  Denies problems  Heme/Lymphatic:  Denies problems  Allergic/Immunologic:  Denies problems       Objective:         Vitals:    10/06/17 1601   BP: 128/80   Pulse: 68   Resp: 16   Temp: 98  F (36.7  C)   TempSrc: Oral   Weight: 189 lb 4 oz (85.8 kg)   Height: 5' 2.5\" (1.588 m)       Physical Exam:  General Appearance: Alert, cooperative, no distress, appears stated age   Head: Normocephalic, without obvious abnormality, atraumatic  Eyes: PERRL, conjunctiva/corneas clear, EOM's intact   Ears: Normal TM's and external ear canals, both ears  Nose:Nares normal, septum midline,mucosa normal, no drainage    Throat:Lips, mucosa, and tongue normal; teeth and gums normal  Neck: Supple, symmetrical, trachea midline, no adenopathy;  thyroid: not enlarged, symmetric, no tenderness/mass/nodules  Back: Symmetric, no curvature, ROM normal,  Lungs: Clear to auscultation bilaterally, respirations unlabored  Breasts: No breast " masses, tenderness, asymmetry, or nipple discharge.  Heart: Regular rate and rhythm, S1 and S2 normal, no murmur, rub, or gallop  Abdomen: Soft, non-tender, bowel sounds active all four quadrants,  no masses, no organomegaly  Extremities: Extremities normal, atraumatic, no cyanosis or edema  Skin: Skin color, texture, turgor normal, no rashes or lesions  Lymph nodes: Cervical, supraclavicular, and axillary nodes normal and   Neurologic: Normal

## 2021-06-13 NOTE — PROGRESS NOTES
Assessment and Plan:   Kelsey is a very pleasant 65-year-old female presenting for an annual wellness visit.    Patient has been advised of split billing requirements and indicates understanding: Yes     1. Routine general medical examination at a health care facility    2. HTN (hypertension)  Blood pressure is just slightly above goal today but patient states she did not take her medications this morning.  CMP will be rechecked.  - amLODIPine (NORVASC) 5 MG tablet; Take 1 tablet (5 mg total) by mouth daily.  Dispense: 90 tablet; Refill: 3  - carvediloL (COREG) 12.5 MG tablet; Take 1 tablet (12.5 mg total) by mouth 2 (two) times a day with meals.  Dispense: 180 tablet; Refill: 3  - hydroCHLOROthiazide (HYDRODIURIL) 25 MG tablet; Take 1 tablet (25 mg total) by mouth daily.  Dispense: 90 tablet; Refill: 3  - potassium citrate (UROCIT-K) 10 mEq (1,080 mg) SR tablet; Take 2 tablets (20 mEq total) by mouth 2 (two) times a day.  Dispense: 360 tablet; Refill: 3  - Lipid [place] FASTING  - Comprehensive Metabolic Panel    3. Cough  Refill albuterol sent to the pharmacy.  - albuterol (PROAIR HFA;PROVENTIL HFA;VENTOLIN HFA) 90 mcg/actuation inhaler; Inhale 2 puffs every 6 (six) hours as needed for wheezing.  Dispense: 1 each; Refill: 0     The patient's current medical problems were reviewed.    The following high BMI interventions were performed this visit: encouragement to exercise  The following health maintenance schedule was reviewed with the patient and provided in printed form in the after visit summary:   Health Maintenance   Topic Date Due     HEPATITIS C SCREENING  1955     ADVANCE CARE PLANNING  07/31/1971     ZOSTER VACCINES (1 of 2) 07/31/2003     DXA SCAN  07/31/2018     MAMMOGRAM  11/13/2019     MEDICARE ANNUAL WELLNESS VISIT  11/10/2021     FALL RISK ASSESSMENT  11/10/2021     LIPID  01/14/2024     TD 18+ HE  10/06/2027     COLORECTAL CANCER SCREENING  09/06/2028     Pneumococcal Vaccine: 65+ Years   Completed     INFLUENZA VACCINE RULE BASED  Completed     Pneumococcal Vaccine: Pediatrics (0 to 5 Years) and At-Risk Patients (6 to 64 Years)  Aged Out     HEPATITIS B VACCINES  Aged Out        Subjective:   Chief Complaint: Kelsey Acevedo is an 65 y.o. female here for an Annual Wellness visit.   HPI: Roxanne is a very pleasant 65-year-old female presenting to the clinic today for an annual wellness visit.    She is overall doing well.  About 1 month ago she had a microdiscectomy and notes that she has been feeling much improved from this.  She is trying to get out and walk which has been helpful in her healing process.    She otherwise really has no concerns today.    Review of Systems:   Please see above.  The rest of the review of systems are negative for all systems.    Patient Care Team:  Millie De Paz MD as PCP - General (Family Medicine)  Millie De Paz MD as Assigned PCP     Patient Active Problem List   Diagnosis     Diverticular disease of colon     Benign essential hypertension     Morgagni hernia     Past Medical History:   Diagnosis Date     Bladder stone      Hypertension       Past Surgical History:   Procedure Laterality Date     bladder prolapse       BLADDER STONE REMOVAL       eyelid surgery       FOOT SURGERY       HYSTERECTOMY        Family History   Problem Relation Age of Onset     Hypertension Mother      Cancer Mother         bladder     Diabetes Father      Heart disease Father       Social History     Socioeconomic History     Marital status:      Spouse name: Not on file     Number of children: Not on file     Years of education: Not on file     Highest education level: Not on file   Occupational History     Not on file   Social Needs     Financial resource strain: Not on file     Food insecurity     Worry: Not on file     Inability: Not on file     Transportation needs     Medical: Not on file     Non-medical: Not on file   Tobacco Use     Smoking status: Former  "Smoker     Smokeless tobacco: Never Used   Substance and Sexual Activity     Alcohol use: Not on file     Drug use: Not on file     Sexual activity: Not on file   Lifestyle     Physical activity     Days per week: Not on file     Minutes per session: Not on file     Stress: Not on file   Relationships     Social connections     Talks on phone: Not on file     Gets together: Not on file     Attends Hindu service: Not on file     Active member of club or organization: Not on file     Attends meetings of clubs or organizations: Not on file     Relationship status: Not on file     Intimate partner violence     Fear of current or ex partner: Not on file     Emotionally abused: Not on file     Physically abused: Not on file     Forced sexual activity: Not on file   Other Topics Concern     Not on file   Social History Narrative     Not on file      Current Outpatient Medications   Medication Sig Dispense Refill     albuterol (PROAIR HFA;PROVENTIL HFA;VENTOLIN HFA) 90 mcg/actuation inhaler Inhale 2 puffs every 6 (six) hours as needed for wheezing. 1 each 0     amLODIPine (NORVASC) 5 MG tablet Take 1 tablet (5 mg total) by mouth daily. 90 tablet 3     carvediloL (COREG) 12.5 MG tablet Take 1 tablet (12.5 mg total) by mouth 2 (two) times a day with meals. 180 tablet 3     cholecalciferol, vitamin D3, (VITAMIN D3 ORAL) Take by mouth.       hydroCHLOROthiazide (HYDRODIURIL) 25 MG tablet Take 1 tablet (25 mg total) by mouth daily. 90 tablet 3     potassium citrate (UROCIT-K) 10 mEq (1,080 mg) SR tablet Take 2 tablets (20 mEq total) by mouth 2 (two) times a day. 360 tablet 3     No current facility-administered medications for this visit.       Objective:   Vital Signs:   Visit Vitals  /90   Pulse 70   Temp (!) 96.4  F (35.8  C) (Oral)   Resp 16   Ht 5' 2.5\" (1.588 m)   Wt 189 lb 8 oz (86 kg)   Breastfeeding No   BMI 34.11 kg/m    Repeat blood pressure: 140/90      VisionScreening:  No exam data present     PHYSICAL " EXAM    Physical Exam:  General Appearance: Alert, cooperative, no distress, appears stated age   Head: Normocephalic, without obvious abnormality, atraumatic  Eyes: PERRL, conjunctiva/corneas clear, EOM's intact   Ears: Normal TM's and external ear canals, both ears  Nose:Nares normal, septum midline,mucosa normal, no drainage    Throat:Lips, mucosa, and tongue normal; teeth and gums normal  Neck: Supple, symmetrical, trachea midline, no adenopathy;  thyroid: not enlarged, symmetric, no tenderness/mass/nodules  Back: Symmetric, no curvature, ROM normal,  Lungs: Clear to auscultation bilaterally, respirations unlabored  Breasts: No breast masses, tenderness, asymmetry, or nipple discharge.  Heart: Regular rate and rhythm, S1 and S2 normal, no murmur, rub, or gallop  Abdomen: Soft, non-tender, bowel sounds active all four quadrants,  no masses, no organomegaly  Extremities: Extremities normal, atraumatic, no cyanosis or edema  Skin: Skin color, texture, turgor normal, no rashes or lesions  Lymph nodes: Cervical, supraclavicular, and axillary nodes normal and   Neurologic: Normal            Assessment Results 11/10/2020   Activities of Daily Living No help needed   Instrumental Activities of Daily Living 1 - Full function   Mini Cog Total Score 5   Some recent data might be hidden     A Mini-Cog score of 0-2 suggests the possibility of dementia, score of 3-5 suggests no dementia      Identified Health Risks:     The patient reports that she has difficulty with instrumental activities of daily living.  She was provided with a list of local organizations that provide support services and advised to make a follow up appointment in 12 weeks to address this further.   Information regarding advance directives (living [place]), including where she can download the appropriate form, was provided to the patient via the AVS.

## 2021-06-16 ENCOUNTER — RECORDS - HEALTHEAST (OUTPATIENT)
Dept: ADMINISTRATIVE | Facility: OTHER | Age: 66
End: 2021-06-16

## 2021-06-16 PROBLEM — Q79.0 MORGAGNI HERNIA: Status: ACTIVE | Noted: 2019-10-29

## 2021-06-16 PROBLEM — I10 BENIGN ESSENTIAL HYPERTENSION: Status: ACTIVE | Noted: 2019-03-24

## 2021-06-18 NOTE — PATIENT INSTRUCTIONS - HE
Patient Instructions by Millie De Paz MD at 11/10/2020  1:20 PM     Author: Millie De Paz MD Service: -- Author Type: Physician    Filed: 11/10/2020  1:28 PM Encounter Date: 11/10/2020 Status: Signed    : Millie De Paz MD (Physician)         Patient Education   Instrumental Activities of Daily Living  Your Health Risk Assessment indicates you have difficulties with instrumental activities of daily living which include laundry, housekeeping, banking, shopping, using the telephone, food preparation, transportation, or taking your own medications. Please make a follow up appointment for us to address this issue in more detail.    [employer] has resources available on the following website: [email or web address]     Also, here is a local agency that provides help with meals and other assistance:   [facility] Line: [phone]     Patient Education   Understanding Advance Care Planning  Advance care planning is the process of deciding ones own future medical care. It helps ensure that if you cant speak for yourself, your wishes can still be carried out. The plan is a series of legal documents that note a persons wishes. The documents vary by state. Advance care planning may be done when a person has a serious illness that is expected to get worse. It may be done before major surgery. And it can help you and your family be prepared in case of a major illness or injury. Advance care planning helps with making decisions at these times.       A health care proxy is a person who acts as the voice of a patient when the patient cant speak for himself or herself. The name of this role varies by state. It may be called a Durable Medical Power of  or Durable Power of  for Healthcare. It may be called an agent, surrogate, or advocate. Or it may be called a representative or decision maker. It is an official duty that is identified by a legal document. The  document also varies by state.    Why Is Advance Care Planning Important?  If a person communicates their healthcare wishes:    They will be given medical care that matches their values and goals.    Their family members will not be forced to make decisions in a crisis with no guidance.  Creating a Plan  Making an advance care plan is often done in 3 steps:    Thinking about ones wishes. To create an advance care plan, you should think about what kind of medical treatment you would want if you lose the ability to communicate. Are there any situations in which you would refuse or stop treatment? Are there therapies you would want or not want? And whom do you want to make decisions for you? There are many places to learn more about how to plan for your care. Ask your doctor or  for resources.    Picking a health care proxy. This means choosing a trusted person to speak for you only when you cant speak for yourself. When you cannot make medical decisions, your proxy makes sure the instructions in your advance care plan are followed. A proxy does not make decisions based on his or her own opinions. They must put aside those opinions and values if needed, and carry out your wishes.    Filling out the legal documents. There are several kinds of legal documents for advance care planning. Each one tells health care providers your wishes. The documents may vary by state. They must be signed and may need to be witnessed or notarized. You can cancel or change them whenever you wish. Depending on your state, the documents may include a Healthcare Proxy form, Living Will, Durable Medical Power of , Advance Directive, or others.  The Familys Role  The best help a family can give is to support their loved ones wishes. Open and honest communication is vital. Family should express any concerns they have about the patients choices while the patient can still make decisions.    6682-1319 [employer]  [address] [place] [address]. All rights reserved. This information is not intended as a substitute for professional medical care. Always follow your healthcare professional's instructions.         Also, [facility] offers a free, downloadable health care directive that allows you to share your treatment choices and personal preferences if you cannot communicate your wishes. It also allows you to appoint another person (called a health care agent) to make health care decisions if you are unable to do so. You can download an advance directive by going here: [email or web address]     Patient Education   Personalized Prevention Plan  You are due for the preventive services outlined below.  Your care team is available to assist you in scheduling these services.  If you have already completed any of these items, please share that information with your care team to update in your medical record.  Health Maintenance   Topic Date Due   ? HEPATITIS C SCREENING  1955   ? ADVANCE CARE PLANNING  07/31/1971   ? ZOSTER VACCINES (1 of 2) 07/31/2003   ? DXA SCAN  07/31/2018   ? MAMMOGRAM  11/13/2019   ? Pneumococcal Vaccine: 65+ Years (2 of 2 - PPSV23) 07/19/2020   ? MEDICARE ANNUAL WELLNESS VISIT  11/10/2021   ? FALL RISK ASSESSMENT  11/10/2021   ? LIPID  01/14/2024   ? TD 18+ HE  10/06/2027   ? COLORECTAL CANCER SCREENING  09/06/2028   ? INFLUENZA VACCINE RULE BASED  Completed   ? Pneumococcal Vaccine: Pediatrics (0 to 5 Years) and At-Risk Patients (6 to 64 Years)  Aged Out   ? HEPATITIS B VACCINES  Aged Out

## 2021-06-19 NOTE — PROGRESS NOTES
Assessment/Plan:    Kelsey Acevedo is a 62 y.o. female presenting for:    1. Screen for colon cancer  - Ambulatory referral for Colonoscopy    2. HTN (hypertension)  Well controlled today - BMP  - Basic Metabolic Panel  - amLODIPine (NORVASC) 5 MG tablet; Take 1 tablet (5 mg total) by mouth daily.  Dispense: 90 tablet; Refill: 1  - carvedilol (COREG) 12.5 MG tablet; Take 1 tablet (12.5 mg total) by mouth 2 (two) times a day with meals.  Dispense: 180 tablet; Refill: 1  - hydroCHLOROthiazide (HYDRODIURIL) 25 MG tablet; Take 1 tablet (25 mg total) by mouth daily.  Dispense: 90 tablet; Refill: 1  - potassium citrate (UROCIT-K) 10 mEq (1,080 mg) SR tablet; Take 2 tablets (20 mEq total) by mouth 2 (two) times a day.  Dispense: 360 tablet; Refill: 1      Medications Discontinued During This Encounter   Medication Reason     CALCIUM CARBONATE/VITAMIN D3 (CALTRATE 600 + D ORAL)      amLODIPine (NORVASC) 5 MG tablet Reorder     carvedilol (COREG) 12.5 MG tablet Reorder     hydroCHLOROthiazide (HYDRODIURIL) 25 MG tablet Reorder     potassium citrate (UROCIT-K) 10 mEq (1,080 mg) SR tablet Reorder           Chief Complaint:  Chief Complaint   Patient presents with     Blood Pressure Check     Medication Refill       Subjective:   Kelsey Acevedo is a very pleasant 62-year-old female presenting to the clinic today to discuss her hypertension.  Patient is a past medical history significant for hypertension is currently taking amlodipine, carvedilol and hydrochlorothiazide.  She is doing well with the medications.  She has not had any chest pain or shortness of breath.  She does notice occasional palpitations but they will tend to norah fairly quickly.  She states that she wore an event monitor at one point a few years ago and it was normal.  She is unsure if she had palpitations while she was wearing the monitor.  They do not cause her any shortness of breath or dizziness.  She is unsure if there are any exacerbating  "factors.    Her brother was recently diagnosed with atrial fibrillation.    12 point review of systems completed and negative except for what has been described above    History   Smoking Status     Former Smoker   Smokeless Tobacco     Never Used       Current Outpatient Prescriptions   Medication Sig Note     amLODIPine (NORVASC) 5 MG tablet Take 1 tablet (5 mg total) by mouth daily.      aspirin 81 MG EC tablet Take 81 mg by mouth. 8/24/2016: [facility]     carvedilol (COREG) 12.5 MG tablet Take 1 tablet (12.5 mg total) by mouth 2 (two) times a day with meals.      hydroCHLOROthiazide (HYDRODIURIL) 25 MG tablet Take 1 tablet (25 mg total) by mouth daily.      potassium citrate (UROCIT-K) 10 mEq (1,080 mg) SR tablet Take 2 tablets (20 mEq total) by mouth 2 (two) times a day.          Objective:  Vitals:    07/02/18 1306   BP: 110/72   Pulse: 64   Resp: 12   Temp: 98.2  F (36.8  C)   TempSrc: Oral   Weight: 194 lb 3 oz (88.1 kg)   Height: 5' 2.5\" (1.588 m)       Vital signs reviewed and stable  General: No acute distress  Psych: Appropriate affect  HEENT: moist mucous membranes, pupils equal, round, reactive to light and accomodation, posterior oropharynx clear of erythema or exudate, tympanic membranes are pearly grey bilaterally  Lymph: no cervical or supraclavicular lymphadenopathy  Cardiovascular: regular rate and rhythm with no murmur  Pulmonary: clear to auscultation bilaterally with no wheeze  Abdomen: soft, non tender, non distended with normo-active bowel sounds  Extremities: warm and well perfused with no edema  Skin: warm and dry with no rash         This note has been dictated and transcribed using voice recognition software.   Any errors in transcription are unintentional and inherent to the software.  "

## 2021-06-22 NOTE — TELEPHONE ENCOUNTER
Refill Approved    Rx renewed per Medication Renewal Policy. Medication was last renewed on 7/2/18.    Livia Freire, [facility] Triage/Med Refill 1/8/2019     Requested Prescriptions   Pending Prescriptions Disp Refills     carvedilol (COREG) 12.5 MG tablet [Pharmacy Med Name: CARVEDILOL 12.5MG TABS] 180 tablet 1     Sig: TAKE ONE TABLET BY MOUTH TWICE A DAY WITH MEALS    Beta-Blockers Refill Protocol Passed - 1/7/2019  4:32 AM       Passed - PCP or prescribing provider visit in past 12 months or next 3 months    Last office visit with prescriber/PCP: 7/2/2018 Millie De Paz MD OR same dept: 7/2/2018 Millie De Paz MD OR same specialty: 7/2/2018 Millie De Paz MD  Last physical: 10/6/2017 Last MTM visit: Visit date not found   Next visit within 3 mo: Visit date not found  Next physical within 3 mo: Visit date not found  Prescriber OR PCP: Millie De Paz MD  Last diagnosis associated with med order: 1. HTN (hypertension)  - carvedilol (COREG) 12.5 MG tablet [Pharmacy Med Name: CARVEDILOL 12.5MG TABS]; TAKE ONE TABLET BY MOUTH TWICE A DAY WITH MEALS  Dispense: 180 tablet; Refill: 1  - amLODIPine (NORVASC) 5 MG tablet [Pharmacy Med Name: AMLODIPINE BESYLATE 5MG TABS]; TAKE ONE TABLET BY MOUTH EVERY DAY  Dispense: 90 tablet; Refill: 1    If protocol passes may refill for 12 months if within 3 months of last provider visit (or a total of 15 months).            Passed - Blood pressure filed in past 12 months    BP Readings from Last 1 Encounters:   07/02/18 110/72             amLODIPine (NORVASC) 5 MG tablet [Pharmacy Med Name: AMLODIPINE BESYLATE 5MG TABS] 90 tablet 1     Sig: TAKE ONE TABLET BY MOUTH EVERY DAY    Calcium-Channel Blockers Protocol Passed - 1/7/2019  4:32 AM       Passed - PCP or prescribing provider visit in past 12 months or next 3 months    Last office visit with prescriber/PCP: 7/2/2018 Millie De Paz MD OR same dept: 7/2/2018 Millie De Paz  MD OR same specialty: 7/2/2018 Millie De Paz MD  Last physical: 10/6/2017 Last MTM visit: Visit date not found   Next visit within 3 mo: Visit date not found  Next physical within 3 mo: Visit date not found  Prescriber OR PCP: Millie De Paz MD  Last diagnosis associated with med order: 1. HTN (hypertension)  - carvedilol (COREG) 12.5 MG tablet [Pharmacy Med Name: CARVEDILOL 12.5MG TABS]; TAKE ONE TABLET BY MOUTH TWICE A DAY WITH MEALS  Dispense: 180 tablet; Refill: 1  - amLODIPine (NORVASC) 5 MG tablet [Pharmacy Med Name: AMLODIPINE BESYLATE 5MG TABS]; TAKE ONE TABLET BY MOUTH EVERY DAY  Dispense: 90 tablet; Refill: 1    If protocol passes may refill for 12 months if within 3 months of last provider visit (or a total of 15 months).            Passed - Blood pressure filed in past 12 months    BP Readings from Last 1 Encounters:   07/02/18 110/72

## 2021-06-22 NOTE — TELEPHONE ENCOUNTER
Refill Approved    Rx renewed per Medication Renewal Policy. Medication was last renewed on 7/2/18.    Ov: 7/2/18    Dahlia Castle, [facility] Triage/Med Refill 1/3/2019     Requested Prescriptions   Pending Prescriptions Disp Refills     hydroCHLOROthiazide (HYDRODIURIL) 25 MG tablet [Pharmacy Med Name: HYDROCHLOROTHIAZIDE 25MG TABS] 90 tablet 1     Sig: TAKE ONE TABLET BY MOUTH EVERY DAY    Diuretics/Combination Diuretics Refill Protocol  Passed - 1/2/2019  6:07 PM       Passed - Visit with PCP or prescribing provider visit in past 12 months    Last office visit with prescriber/PCP: 7/2/2018 Millie De Paz MD OR same dept: 7/2/2018 Millie De Paz MD OR same specialty: 7/2/2018 Millie De Paz MD  Last physical: 10/6/2017 Last MTM visit: Visit date not found   Next visit within 3 mo: Visit date not found  Next physical within 3 mo: Visit date not found  Prescriber OR PCP: Millie De Paz MD  Last diagnosis associated with med order: 1. HTN (hypertension)  - hydroCHLOROthiazide (HYDRODIURIL) 25 MG tablet [Pharmacy Med Name: HYDROCHLOROTHIAZIDE 25MG TABS]; TAKE ONE TABLET BY MOUTH EVERY DAY  Dispense: 90 tablet; Refill: 1    If protocol passes may refill for 12 months if within 3 months of last provider visit (or a total of 15 months).            Passed - Serum Potassium in past 12 months     Lab Results   Component Value Date    Potassium 3.2 (L) 07/02/2018            Passed - Serum Sodium in past 12 months     Lab Results   Component Value Date    Sodium 142 07/02/2018            Passed - Blood pressure on file in past 12 months    BP Readings from Last 1 Encounters:   07/02/18 110/72            Passed - Serum Creatinine in past 12 months     Creatinine   Date Value Ref Range Status   07/02/2018 0.70 0.60 - 1.10 mg/dL Final

## 2021-06-23 NOTE — PROGRESS NOTES
Assessment/Plan:    Kelsey Acevedo is a 63 y.o. female presenting for:    1. Benign essential hypertension  Blood pressure is at goal today.  Refill of amlodipine, Coreg, and hydrochlorothiazide sent earlier this month.  Refill of potassium supplement sent as well.  Recheck potassium level today.  - Lipid Cascade RANDOM  - Basic Metabolic Panel    2. Cough  For continued cough I will send albuterol to the pharmacy as she says she has been wheezing.  Tessalon Perles sent to the pharmacy as well.  Instructed her to use albuterol 3-4 times daily for the next week and then decrease to twice daily and then just as needed.  - albuterol (PROAIR HFA;PROVENTIL HFA;VENTOLIN HFA) 90 mcg/actuation inhaler; Inhale 2 puffs every 6 (six) hours as needed for wheezing.  Dispense: 1 each; Refill: 0  - benzonatate (TESSALON PERLES) 100 MG capsule; Take 1 capsule (100 mg total) by mouth 3 (three) times a day as needed for cough.  Dispense: 30 capsule; Refill: 0    3. HTN (hypertension)  Please see above.  - potassium citrate (UROCIT-K) 10 mEq (1,080 mg) SR tablet; Take 2 tablets (20 mEq total) by mouth 2 (two) times a day.  Dispense: 360 tablet; Refill: 1        Medications Discontinued During This Encounter   Medication Reason     albuterol (VENTOLIN HFA) 90 mcg/actuation inhaler Therapy completed     potassium citrate (UROCIT-K) 10 mEq (1,080 mg) SR tablet Reorder           Chief Complaint:  Chief Complaint   Patient presents with     Blood Pressure Check       Subjective:   Kelsey Acevedo is a very pleasant 63-year-old female with past medical history significant for hypertension currently taking amlodipine, carvedilol and hydrochlorothiazide presenting to the clinic today with concerns over cough and for blood pressure check.    1.  cough: Patient states that about 3-4 weeks ago she had an upper respiratory infection.  Also symptoms have somewhat cleared however she continues to cough.  She notes that sometimes she will feel  "slightly short of breath with the coughing is slightly wheezy.  She states that she feels as though she has a history of some reactive airway disease.  She does not take any medication regularly for asthma or reactive airway disease.  She did have an inhaler at home but it was  and so she did not use it.  She is not having any fevers.  No sinus congestion.    2.  Hypertension: Patient is currently taking her medications and not having any issues.  She does take potassium 2 tablets daily.  She is scheduled to take 4 tablets daily but she often forgets to take the second 2.  Her most recent potassium level about 6 months ago was somewhat low.  We will recheck this today as well as a cholesterol level.    12 point review of systems completed and negative except for what has been described above    Social History     Tobacco Use   Smoking Status Former Smoker   Smokeless Tobacco Never Used       Current Outpatient Medications   Medication Sig Note     amLODIPine (NORVASC) 5 MG tablet TAKE ONE TABLET BY MOUTH EVERY DAY      aspirin 81 MG EC tablet Take 81 mg by mouth. 2016: Received from: [facility]     carvedilol (COREG) 12.5 MG tablet TAKE ONE TABLET BY MOUTH TWICE A DAY WITH MEALS      hydroCHLOROthiazide (HYDRODIURIL) 25 MG tablet TAKE ONE TABLET BY MOUTH EVERY DAY      potassium citrate (UROCIT-K) 10 mEq (1,080 mg) SR tablet Take 2 tablets (20 mEq total) by mouth 2 (two) times a day.      albuterol (PROAIR HFA;PROVENTIL HFA;VENTOLIN HFA) 90 mcg/actuation inhaler Inhale 2 puffs every 6 (six) hours as needed for wheezing.      benzonatate (TESSALON PERLES) 100 MG capsule Take 1 capsule (100 mg total) by mouth 3 (three) times a day as needed for cough.          Objective:  Vitals:    19 1414   BP: 124/64   Pulse: 68   Resp: 18   Temp: 98.3  F (36.8  C)   TempSrc: Oral   Weight: 198 lb 4 oz (89.9 kg)   Height: 5' 2.5\" (1.588 m)       Body mass index is 35.68 " kg/m .    Vital signs reviewed and stable  General: No acute distress  Psych: Appropriate affect  HEENT: moist mucous membranes, pupils equal, round, reactive to light and accomodation, posterior oropharynx clear of erythema or exudate, tympanic membranes are pearly grey bilaterally  Lymph: no cervical or supraclavicular lymphadenopathy  Cardiovascular: regular rate and rhythm with no murmur  Pulmonary: clear to auscultation bilaterally with no wheeze  Abdomen: soft, non tender, non distended with normo-active bowel sounds  Extremities: warm and well perfused with no edema  Skin: warm and dry with no rash         This note has been dictated and transcribed using voice recognition software.   Any errors in transcription are unintentional and inherent to the software.

## 2021-06-23 NOTE — TELEPHONE ENCOUNTER
RN cannot approve Refill Request    RN can NOT refill this medication med is not covered by policy/route to provider. Last office visit: 1/14/2019 Millie De Paz MD Last Physical: 10/6/2017 Last MTM visit: Visit date not found Last visit same specialty: 1/14/2019 Millie De Paz MD.  Next visit within 3 mo: Visit date not found  Next physical within 3 mo: Visit date not found      Brigitte Flores, [facility] Triage/Med Refill 2/1/2019    Requested Prescriptions   Pending Prescriptions Disp Refills     benzonatate (TESSALON PERLES) 100 MG capsule 30 capsule 0     Sig: Take 1 capsule (100 mg total) by mouth 3 (three) times a day as needed for cough.    There is no refill protocol information for this order

## 2021-06-23 NOTE — PATIENT INSTRUCTIONS - HE
Albuterol - use 2 puffs 3-4 times daily for a week and then wean down to 2 puffs twice daily for a week and then stop    Tessalon Perles - decrease spasming in lungs - up to three times daily

## 2021-06-24 NOTE — TELEPHONE ENCOUNTER
Left message for pt to call back.  Please relay MD message below and document call was returned.  Mychart was also sent to pt regarding this as well.

## 2021-06-24 NOTE — TELEPHONE ENCOUNTER
Dr. Francois-  Pt sent [employer] message:    From: Kelsey Acevedo   Sent: 3/1/2019   5:52 PM   To: [employer] Support Team Pool   Subject: My appt on March 5 with Dr. Francois                  ----- Message from [employer], Background sent at 3/1/2019  5:52 PM CST -----      Topic: Procedural Question      When I made my appt. I forgot to mention that I have a lump in my groin area that's inflamed.  I've had it for about  12 days so I thought that I should let you know in case you need to schedule extra time.  Thank you.      Kelsye Acevedo  1955     Pt is scheduled on 3/5/19 with you at 1120 for 40 minutes for a pre-op. Do you need more time?

## 2021-07-03 NOTE — ADDENDUM NOTE
Addendum Note by Lali Dukes CMA at 10/23/2019 10:20 AM     Author: Lali Dukes CMA Service: -- Author Type: Certified Medical Assistant    Filed: 10/23/2019 10:53 AM Encounter Date: 10/23/2019 Status: Signed    : Lali Dukes CMA (Certified Medical Assistant)    Addended by: LALI DUKES on: 10/23/2019 10:53 AM        Modules accepted: Orders

## 2021-07-03 NOTE — ADDENDUM NOTE
Addendum Note by Millie De Paz MD at 10/14/2020  7:50 AM     Author: Millie De Paz MD Service: -- Author Type: Physician    Filed: 10/14/2020  7:50 AM Encounter Date: 10/13/2020 Status: Signed    : Millie De Paz MD (Physician)    Addended by: MILLIE DE PAZ on: 10/14/2020 07:50 AM        Modules accepted: Orders

## 2021-07-20 ENCOUNTER — ANCILLARY PROCEDURE (OUTPATIENT)
Dept: GENERAL RADIOLOGY | Facility: CLINIC | Age: 66
End: 2021-07-20
Attending: NURSE PRACTITIONER
Payer: COMMERCIAL

## 2021-07-20 ENCOUNTER — OFFICE VISIT (OUTPATIENT)
Dept: FAMILY MEDICINE | Facility: CLINIC | Age: 66
End: 2021-07-20
Payer: COMMERCIAL

## 2021-07-20 VITALS
BODY MASS INDEX: 34.2 KG/M2 | SYSTOLIC BLOOD PRESSURE: 118 MMHG | TEMPERATURE: 98.4 F | OXYGEN SATURATION: 96 % | WEIGHT: 190 LBS | HEART RATE: 75 BPM | DIASTOLIC BLOOD PRESSURE: 79 MMHG

## 2021-07-20 DIAGNOSIS — J20.8 ACUTE BRONCHITIS DUE TO OTHER SPECIFIED ORGANISMS: Primary | ICD-10-CM

## 2021-07-20 DIAGNOSIS — R05.9 COUGH: ICD-10-CM

## 2021-07-20 LAB
BASOPHILS # BLD AUTO: 0 10E3/UL (ref 0–0.2)
BASOPHILS NFR BLD AUTO: 1 %
EOSINOPHIL # BLD AUTO: 0.1 10E3/UL (ref 0–0.7)
EOSINOPHIL NFR BLD AUTO: 1 %
ERYTHROCYTE [DISTWIDTH] IN BLOOD BY AUTOMATED COUNT: 13.8 % (ref 10–15)
HCT VFR BLD AUTO: 40.1 % (ref 35–47)
HGB BLD-MCNC: 13.6 G/DL (ref 11.7–15.7)
LYMPHOCYTES # BLD AUTO: 1.2 10E3/UL (ref 0.8–5.3)
LYMPHOCYTES NFR BLD AUTO: 23 %
MCH RBC QN AUTO: 30.4 PG (ref 26.5–33)
MCHC RBC AUTO-ENTMCNC: 33.9 G/DL (ref 31.5–36.5)
MCV RBC AUTO: 90 FL (ref 78–100)
MONOCYTES # BLD AUTO: 0.6 10E3/UL (ref 0–1.3)
MONOCYTES NFR BLD AUTO: 13 %
NEUTROPHILS # BLD AUTO: 3.2 10E3/UL (ref 1.6–8.3)
NEUTROPHILS NFR BLD AUTO: 63 %
PLATELET # BLD AUTO: 204 10E3/UL (ref 150–450)
RBC # BLD AUTO: 4.47 10E6/UL (ref 3.8–5.2)
WBC # BLD AUTO: 5.1 10E3/UL (ref 4–11)

## 2021-07-20 PROCEDURE — 99213 OFFICE O/P EST LOW 20 MIN: CPT | Performed by: NURSE PRACTITIONER

## 2021-07-20 PROCEDURE — 36415 COLL VENOUS BLD VENIPUNCTURE: CPT | Performed by: NURSE PRACTITIONER

## 2021-07-20 PROCEDURE — 71046 X-RAY EXAM CHEST 2 VIEWS: CPT | Mod: FY | Performed by: RADIOLOGY

## 2021-07-20 PROCEDURE — 85025 COMPLETE CBC W/AUTO DIFF WBC: CPT | Performed by: NURSE PRACTITIONER

## 2021-07-20 RX ORDER — PREDNISONE 20 MG/1
40 TABLET ORAL DAILY
Qty: 14 TABLET | Refills: 0 | Status: SHIPPED | OUTPATIENT
Start: 2021-07-20 | End: 2021-07-27

## 2021-07-20 RX ORDER — GUAIFENESIN 600 MG/1
1200 TABLET, EXTENDED RELEASE ORAL 2 TIMES DAILY
Qty: 30 TABLET | Refills: 0 | Status: SHIPPED | OUTPATIENT
Start: 2021-07-20 | End: 2021-09-08

## 2021-07-20 RX ORDER — BENZONATATE 200 MG/1
200 CAPSULE ORAL 3 TIMES DAILY PRN
Qty: 30 CAPSULE | Refills: 0 | Status: SHIPPED | OUTPATIENT
Start: 2021-07-20 | End: 2021-08-10

## 2021-07-20 NOTE — PATIENT INSTRUCTIONS
Patient Education     Acute Bronchitis  Your healthcare provider has told you that you have acute bronchitis. Bronchitis is infection or inflammation of the airways in the lungs (bronchial tubes). Normally, air moves easily in and out of the airways. Bronchitis narrows the airways. This makes it harder for air to flow in and out of the lungs. This causes symptoms such as shortness of breath, coughing up yellow or green mucus, and wheezing.  Bronchitis can be acute or chronic. Acute means it happens quickly and goes away in a short time. Chronic means a condition lasts a long time and often comes back. Most people with acute bronchitis get better in 1 to 2 weeks.     What causes acute bronchitis?  Acute bronchitis is often caused by a virus such as a cold or the flu. In some cases, it may be caused by bacteria. Certain factors make it more likely for a cold or flu to turn into bronchitis. These include being very young, being elderly, having a heart or lung problem, or having a weak immune system. Cigarette smoking also makes bronchitis more likely.  When bronchitis develops, the airways become swollen. The airways may also become infected with bacteria. This is known as a secondary infection.  Symptoms of acute bronchitis  Symptoms can include:    Coughing with mucus    Wheezing    Feeling short of breath    Chest pain    Fever  Diagnosing acute bronchitis  Your healthcare provider will ask about your symptoms and health history. He or she will give you a physical exam. This will include listening to your lungs while you breathe. You may have a chest X-ray to look for infection in the lungs (pneumonia) if you have had a fever. You may also have blood tests to check for infection.  Treating acute bronchitis  Bronchitis usually goes away in 1 to 2 weeks without treatment. You can help feel better by:    Taking medicine as directed. Talk to your healthcare provider before taking any over-the-counter medicines (OTC).  Some OTC medicines help relieve inflammation in your bronchial tubes. They can also thin mucus. This makes it easier to cough up. Your healthcare provider may prescribe an inhaler to help open up the bronchial tubes. Most of the time, acute bronchitis is caused by a viral infection. Antibiotics are usually not prescribed for viral infections.    Drinking plenty of fluids, such as water, juice, or warm soup. Fluids loosen mucus so that you can cough it up. This helps you breathe more easily. Fluids also prevent dehydration.    Using a humidifier. This can help reduce coughing.    Getting plenty of rest    Not smoking. Also, don't let anyone else smoke in your home. In public places, move away from secondhand smoke.  Recovery and follow-up  Follow up with your doctor. You will likely feel better in 1 to 2 weeks. But you may have a dry cough for a longer time. Let your doctor know if you still have symptoms other than a dry cough after 2 weeks. Tell him or her if you get bronchial infections often.  Self-care tips  To get relief from your symptoms and prevent bronchitis:    Stop smoking. Stopping smoking is the most important step you can take to treat bronchitis. If you need help stopping smoking, talk with your healthcare provider.    Stay away from secondhand smoke and other irritants. Try to stay away from smoke, chemicals, fumes, and dust. Don t let anyone smoke in your home. Stay indoors on smoggy days.    Prevent lung infections. Ask your healthcare provider about the flu and pneumonia vaccines. Take steps to prevent colds and other lung infections.    Wash your hands well. Wash your hands often with soap and water. Use hand  when you can t wash your hands. Stay away from crowds during cold and flu season.  When to call your healthcare provider  Call the healthcare provider if you have any of these:    Fever of 100.4 F ( 38.0 C) or higher, or as directed by your healthcare provider    Symptoms that get  worse, or new symptoms    Breathing not getting better with treatments    Symptoms that don t start to get better in 1 week  writewith last reviewed this educational content on 8/1/2019 2000-2021 [employer]. All rights reserved. This information is not intended as a substitute for professional medical care. Always follow your healthcare professional's instructions.

## 2021-07-20 NOTE — PROGRESS NOTES
"    Assessment & Plan     Acute bronchitis due to other specified organisms  Likely viral/inflammatory in nature and self limiting. WBC was normal with normal diff, chest x-ray read interpreted by myself was negative for infiltrate.   - predniSONE (DELTASONE) 20 MG tablet; Take 2 tablets (40 mg) by mouth daily for 7 days  - benzonatate (TESSALON) 200 MG capsule; Take 1 capsule (200 mg) by mouth 3 times daily as needed for cough  - guaiFENesin (MUCINEX) 600 MG 12 hr tablet; Take 2 tablets (1,200 mg) by mouth 2 times daily    Cough  - XR Chest 2 Views; Future  - CBC with platelets and differential; Future  - CBC with platelets and differential       BMI:   Estimated body mass index is 34.2 kg/m  as calculated from the following:    Height as of 11/10/20: 1.588 m (5' 2.5\").    Weight as of this encounter: 86.2 kg (190 lb).     Return in about 1 week (around 7/27/2021), or if symptoms worsen or fail to improve, for Follow up.    RADHA Lowery [facility] IZA Cole is a 65 year old who presents for the following health issues.    HPI     Acute Illness  Acute illness concerns: Cough  Onset/Duration: 7/15/21  Symptoms:  Fever: no  Chills/Sweats: no  Headache (location?): no  Sinus Pressure: no  Conjunctivitis:  no  Ear Pain: no  Sore Throat: YES- Unsure if its from the cough  Cough: YES - Heavy and dry, lately more mucus that is a little red  Wheeze: YES- SOB    5 days of coughing, hacking with spells, productive coughing with reddish phelgm, shortness of breath, wheezing. No fevers, chills, body aches. History of asthma. Uses Albuterol as needed.     Review of Systems   Constitutional: Negative.    HENT: Positive for rhinorrhea.    Respiratory: Positive for cough, shortness of breath and wheezing.    Cardiovascular: Negative.    All other systems reviewed and are negative.           Objective    There were no vitals taken for this visit.  There is no height or weight on file to " calculate BMI.  Physical Exam  Constitutional:       Appearance: Normal appearance.   HENT:      Right Ear: Tympanic membrane, ear canal and external ear normal.      Left Ear: Tympanic membrane, ear canal and external ear normal.      Mouth/Throat:      Mouth: Mucous membranes are moist.      Pharynx: Oropharynx is clear.   Cardiovascular:      Rate and Rhythm: Normal rate and regular rhythm.      Heart sounds: Normal heart sounds.   Pulmonary:      Effort: Pulmonary effort is normal.      Breath sounds: Normal breath sounds.   Musculoskeletal:      Cervical back: Normal range of motion.   Lymphadenopathy:      Cervical: No cervical adenopathy.   Skin:     General: Skin is warm and dry.   Neurological:      Mental Status: She is alert and oriented to person, place, and time.   Psychiatric:         Mood and Affect: Mood normal.         Behavior: Behavior normal.         Thought Content: Thought content normal.         Judgment: Judgment normal.         CXR - Reviewed and interpreted by me Normal- no infiltrates, effusions, pneumothoraces, cardiomegaly or masses  Results for orders placed or performed in visit on 07/20/21 (from the past 24 hour(s))   CBC with platelets and differential    Narrative    The following orders were created for panel order CBC with platelets and differential.  Procedure                               Abnormality         Status                     ---------                               -----------         ------                     CBC with platelets and d...[[id number]]                      Final result                 Please view results for these tests on the individual orders.   CBC with platelets and differential   Result Value Ref Range    WBC Count 5.1 4.0 - 11.0 10e3/uL    RBC Count 4.47 3.80 - 5.20 10e6/uL    Hemoglobin 13.6 11.7 - 15.7 g/dL    Hematocrit 40.1 35.0 - 47.0 %    MCV 90 78 - 100 fL    MCH 30.4 26.5 - 33.0 pg    MCHC 33.9 31.5 - 36.5 g/dL    RDW 13.8 10.0 - 15.0 %     Platelet Count 204 150 - 450 10e3/uL    % Neutrophils 63 %    % Lymphocytes 23 %    % Monocytes 13 %    % Eosinophils 1 %    % Basophils 1 %    Absolute Neutrophils 3.2 1.6 - 8.3 10e3/uL    Absolute Lymphocytes 1.2 0.8 - 5.3 10e3/uL    Absolute Monocytes 0.6 0.0 - 1.3 10e3/uL    Absolute Eosinophils 0.1 0.0 - 0.7 10e3/uL    Absolute Basophils 0.0 0.0 - 0.2 10e3/uL

## 2021-07-21 ASSESSMENT — ENCOUNTER SYMPTOMS
CONSTITUTIONAL NEGATIVE: 1
COUGH: 1
SHORTNESS OF BREATH: 1
RHINORRHEA: 1
CARDIOVASCULAR NEGATIVE: 1
WHEEZING: 1

## 2021-08-03 ENCOUNTER — TRANSFERRED RECORDS (OUTPATIENT)
Dept: HEALTH INFORMATION MANAGEMENT | Facility: CLINIC | Age: 66
End: 2021-08-03

## 2021-08-06 ENCOUNTER — TELEPHONE (OUTPATIENT)
Dept: FAMILY MEDICINE | Facility: CLINIC | Age: 66
End: 2021-08-06

## 2021-08-06 NOTE — TELEPHONE ENCOUNTER
Call placed to patient  Patient reports her cough had improved from office visit with [facility], but cough has continued to persist  States cough is dry and occasionally productive with white phlegm  Denies feeling short of breath  States she has a history of asthma and has been needing to use her inhaler approx twice a day due to intermittent wheezing that is resolved with use of inhaler    Denies fevers  Denies body aches / chills  Denies cold symptoms     Patient requesting follow-up appointment as cough is persisting   Appointment scheduled with [facility] for 8/10/2021 at 1500    No further questions/concerns    Manuel Serrano RN

## 2021-08-06 NOTE — TELEPHONE ENCOUNTER
Reason for call:  Patient reporting a symptom    Symptom or request: cough    Duration (how long have symptoms been present): 2 weeks    Have you been treated for this before? Yes    Additional comments: Patient's cough persists, had 7/20 visit with [facility]. Wondering if needs to be seen again or if there's anything that would help.    Phone Number patient can be reached at:  Cell number on file:    Telephone Information:   Mobile [phone]   Mobile [phone]       Best Time:  any    Can we leave a detailed message on this number:  YES    Call taken on 8/6/2021 at 4:27 PM by Dahlia Perez

## 2021-08-10 ENCOUNTER — OFFICE VISIT (OUTPATIENT)
Dept: FAMILY MEDICINE | Facility: CLINIC | Age: 66
End: 2021-08-10
Payer: COMMERCIAL

## 2021-08-10 VITALS
WEIGHT: 190 LBS | DIASTOLIC BLOOD PRESSURE: 62 MMHG | BODY MASS INDEX: 34.2 KG/M2 | SYSTOLIC BLOOD PRESSURE: 103 MMHG | HEART RATE: 68 BPM | TEMPERATURE: 98.3 F

## 2021-08-10 DIAGNOSIS — J45.20 MILD INTERMITTENT ASTHMA WITHOUT COMPLICATION: Primary | ICD-10-CM

## 2021-08-10 PROCEDURE — 99213 OFFICE O/P EST LOW 20 MIN: CPT | Performed by: NURSE PRACTITIONER

## 2021-08-10 NOTE — PROGRESS NOTES
"    Assessment & Plan     Mild intermittent asthma without complication  Continue using Albuterol as needed. Start the controller inhaler as prescribe by pulmonology.   Let me know if cough returns.   BMI:   Estimated body mass index is 34.2 kg/m  as calculated from the following:    Height as of 11/10/20: 1.588 m (5' 2.5\").    Weight as of this encounter: 86.2 kg (190 lb).     Return in about 1 month (around 9/10/2021), or if symptoms worsen or fail to improve, for Follow up.    RADHA Lowery CNP  [facility] IZA Cole is a 66 year old who presents for the following health issues.      HPI     Concern - Patient was last seen on 7/20/21 for Cough  Description: Patient stated that she still has some wheezing and heaviness in the chest  Intensity: moderate  Progression of Symptoms:  constant  Accompanying Signs & Symptoms: No  Previous history of similar problem: Ongoing for the past 3 weeks    Cough: improved especially in the past couple of days. Had continued to have some asthma symptoms but most of these had improved after the course of prednisone. Has some side effects to prednisone that she didn't like. She had tried to get some more tessalon pearls but for some reason was denied. (The message didn't seem to get to me.) Now feels mostly better. Just took longer than she thought it would. Denies needing antibiotics. Was given a controller inhaler by her lung doctor but didn't start it yet. Wasn't sure on the name is a blue disc with powder. There is no Asha controller medicine on her list. She is to follow up with Lung doctor in a few months.     Review of Systems   Constitutional: Negative.    HENT: Negative.    Respiratory: Positive for cough and wheezing. Negative for shortness of breath.    All other systems reviewed and are negative.         Objective    /62 (BP Location: Right arm, Patient Position: Sitting, Cuff Size: Adult Large)   Pulse 68   Temp 98.3  F " (36.8  C) (Tympanic)   Wt 86.2 kg (190 lb)   BMI 34.20 kg/m    Body mass index is 34.2 kg/m .  Physical Exam  Constitutional:       Appearance: Normal appearance.   HENT:      Head: Normocephalic.      Right Ear: Tympanic membrane, ear canal and external ear normal.      Left Ear: Tympanic membrane, ear canal and external ear normal.      Mouth/Throat:      Mouth: Mucous membranes are moist.      Pharynx: Oropharynx is clear.   Cardiovascular:      Rate and Rhythm: Normal rate and regular rhythm.      Heart sounds: Normal heart sounds.   Pulmonary:      Effort: Pulmonary effort is normal.      Breath sounds: Normal breath sounds.   Musculoskeletal:      Cervical back: Normal range of motion.   Skin:     General: Skin is warm and dry.   Neurological:      Mental Status: She is alert and oriented to person, place, and time.   Psychiatric:         Mood and Affect: Mood normal.         Behavior: Behavior normal.         Thought Content: Thought content normal.         Judgment: Judgment normal.

## 2021-08-11 ASSESSMENT — ENCOUNTER SYMPTOMS
SHORTNESS OF BREATH: 0
COUGH: 1
WHEEZING: 1
CONSTITUTIONAL NEGATIVE: 1

## 2021-09-01 ENCOUNTER — TRANSFERRED RECORDS (OUTPATIENT)
Dept: HEALTH INFORMATION MANAGEMENT | Facility: CLINIC | Age: 66
End: 2021-09-01
Payer: COMMERCIAL

## 2021-09-07 RX ORDER — AMOXICILLIN 250 MG
2 CAPSULE ORAL
COMMUNITY
Start: 2020-01-10 | End: 2021-09-08

## 2021-09-07 RX ORDER — OXYCODONE HYDROCHLORIDE 5 MG/1
TABLET ORAL
COMMUNITY
Start: 2020-10-21 | End: 2021-09-08

## 2021-09-07 RX ORDER — HYDROCORTISONE 25 MG/G
OINTMENT TOPICAL
COMMUNITY
Start: 2021-06-24 | End: 2022-10-17

## 2021-09-07 RX ORDER — TIZANIDINE 2 MG/1
TABLET ORAL
COMMUNITY
Start: 2020-10-21 | End: 2021-09-08

## 2021-09-07 RX ORDER — TRAMADOL HYDROCHLORIDE 50 MG/1
TABLET ORAL
COMMUNITY
Start: 2020-10-27 | End: 2021-09-08

## 2021-09-07 RX ORDER — GABAPENTIN 100 MG/1
CAPSULE ORAL
COMMUNITY
Start: 2021-03-17 | End: 2021-09-08

## 2021-09-08 ENCOUNTER — OFFICE VISIT (OUTPATIENT)
Dept: FAMILY MEDICINE | Facility: CLINIC | Age: 66
End: 2021-09-08
Payer: COMMERCIAL

## 2021-09-08 VITALS
BODY MASS INDEX: 33.87 KG/M2 | TEMPERATURE: 98.2 F | DIASTOLIC BLOOD PRESSURE: 68 MMHG | RESPIRATION RATE: 12 BRPM | HEART RATE: 68 BPM | WEIGHT: 191.13 LBS | SYSTOLIC BLOOD PRESSURE: 112 MMHG | HEIGHT: 63 IN

## 2021-09-08 DIAGNOSIS — Z23 NEEDS FLU SHOT: ICD-10-CM

## 2021-09-08 DIAGNOSIS — I10 HYPERTENSION, UNSPECIFIED TYPE: Primary | ICD-10-CM

## 2021-09-08 PROCEDURE — 90471 IMMUNIZATION ADMIN: CPT | Performed by: FAMILY MEDICINE

## 2021-09-08 PROCEDURE — 36415 COLL VENOUS BLD VENIPUNCTURE: CPT | Performed by: FAMILY MEDICINE

## 2021-09-08 PROCEDURE — 90662 IIV NO PRSV INCREASED AG IM: CPT | Performed by: FAMILY MEDICINE

## 2021-09-08 PROCEDURE — 80048 BASIC METABOLIC PNL TOTAL CA: CPT | Performed by: FAMILY MEDICINE

## 2021-09-08 PROCEDURE — 99214 OFFICE O/P EST MOD 30 MIN: CPT | Mod: 25 | Performed by: FAMILY MEDICINE

## 2021-09-08 ASSESSMENT — ASTHMA QUESTIONNAIRES
QUESTION_2 LAST FOUR WEEKS HOW OFTEN HAVE YOU HAD SHORTNESS OF BREATH: ONCE OR TWICE A WEEK
ACT_TOTALSCORE: 20
QUESTION_3 LAST FOUR WEEKS HOW OFTEN DID YOUR ASTHMA SYMPTOMS (WHEEZING, COUGHING, SHORTNESS OF BREATH, CHEST TIGHTNESS OR PAIN) WAKE YOU UP AT NIGHT OR EARLIER THAN USUAL IN THE MORNING: NOT AT ALL
QUESTION_4 LAST FOUR WEEKS HOW OFTEN HAVE YOU USED YOUR RESCUE INHALER OR NEBULIZER MEDICATION (SUCH AS ALBUTEROL): TWO OR THREE TIMES PER WEEK
QUESTION_1 LAST FOUR WEEKS HOW MUCH OF THE TIME DID YOUR ASTHMA KEEP YOU FROM GETTING AS MUCH DONE AT WORK, SCHOOL OR AT HOME: A LITTLE OF THE TIME
QUESTION_5 LAST FOUR WEEKS HOW WOULD YOU RATE YOUR ASTHMA CONTROL: WELL CONTROLLED

## 2021-09-08 ASSESSMENT — MIFFLIN-ST. JEOR: SCORE: 1358.13

## 2021-09-08 NOTE — PATIENT INSTRUCTIONS
Blood pressure looks great!  DECREASE AMLODIPINE TO 2.5MG DAILY and we can recheck in November at your annual exam

## 2021-09-09 LAB
ANION GAP SERPL CALCULATED.3IONS-SCNC: 6 MMOL/L (ref 3–14)
BUN SERPL-MCNC: 9 MG/DL (ref 7–30)
CALCIUM SERPL-MCNC: 8.8 MG/DL (ref 8.5–10.1)
CHLORIDE BLD-SCNC: 102 MMOL/L (ref 94–109)
CO2 SERPL-SCNC: 31 MMOL/L (ref 20–32)
CREAT SERPL-MCNC: 0.66 MG/DL (ref 0.52–1.04)
GFR SERPL CREATININE-BSD FRML MDRD: >90 ML/MIN/1.73M2
GLUCOSE BLD-MCNC: 88 MG/DL (ref 70–99)
POTASSIUM BLD-SCNC: 3.3 MMOL/L (ref 3.4–5.3)
SODIUM SERPL-SCNC: 139 MMOL/L (ref 133–144)

## 2021-09-09 ASSESSMENT — ASTHMA QUESTIONNAIRES: ACT_TOTALSCORE: 20

## 2021-09-09 NOTE — PROGRESS NOTES
Assessment/Plan:    Kelsey Acevedo is a 66 year old female presenting for:    Hypertension, unspecified type  Blood pressure slightly on the low side today.  We will have her decrease her amlodipine to 2.5 mg daily.  The hope would be to get her off of this completely.  She will follow up with me for her annual wellness visit in the next few months.  If blood pressures are still well controlled we will stop amlodipine and then have her come back for a blood pressure check.  She will contact me if there are questions or concerns.  - Basic metabolic panel  (Ca, Cl, CO2, Creat, Gluc, K, Na, BUN)  - Basic metabolic panel  (Ca, Cl, CO2, Creat, Gluc, K, Na, BUN)    Needs flu shot  - FLU VACCINE, INCREASED ANTIGEN, PRESV FREE    Discussed Covid vaccine booster.  Hopefully by the time she comes back for her annual wellness visit we will be able to offer this for her.    Medications Discontinued During This Encounter   Medication Reason     gabapentin (NEURONTIN) 100 MG capsule      guaiFENesin (MUCINEX) 600 MG 12 hr tablet      oxyCODONE (ROXICODONE) 5 MG tablet      senna-docusate (SENOKOT-S/PERICOLACE) 8.6-50 MG tablet      tiZANidine (ZANAFLEX) 2 MG tablet      traMADol (ULTRAM) 50 MG tablet        I personally spent over 35 minutes performing care related to this patient on the day of the patient visit.  This includes chart review, precharting, talking with/ examining the patient as well as completing after visit documentation.      Chief Complaint:  Blood Pressure Check and Recheck Medication        Subjective:   Kelsey Acevedo is a very pleasant 66-year-old female presenting to the clinic today for medication check.  Patient has a past medical history significant for hypertension which has been fairly well controlled.  She is currently on amlodipine 5 mg a day, carvedilol 12.5 mg twice daily and hydrochlorothiazide.  She is doing well with the medication.  She does not notice any lightheadedness.  No chest pain or  "shortness of breath.    Otherwise, things are going fairly well for her.  She does not have any immediate questions today.    She is hopeful to get her influenza vaccine    She would like to discuss the Covid booster shot    12 point review of systems completed and negative except for what has been described above    History   Smoking Status     Former Smoker   Smokeless Tobacco     Never Used         Current Outpatient Medications:      albuterol (PROAIR HFA;PROVENTIL HFA;VENTOLIN HFA) 90 mcg/actuation inhaler, [ALBUTEROL (PROAIR HFA;PROVENTIL HFA;VENTOLIN HFA) 90 MCG/ACTUATION INHALER] Inhale 2 puffs every 6 (six) hours as needed for wheezing., Disp: 1 each, Rfl: 0     amLODIPine (NORVASC) 5 MG tablet, [AMLODIPINE (NORVASC) 5 MG TABLET] Take 1 tablet (5 mg total) by mouth daily., Disp: 90 tablet, Rfl: 3     carvediloL (COREG) 12.5 MG tablet, [CARVEDILOL (COREG) 12.5 MG TABLET] Take 1 tablet (12.5 mg total) by mouth 2 (two) times a day with meals., Disp: 180 tablet, Rfl: 3     cholecalciferol, vitamin D3, (VITAMIN D3 ORAL), [CHOLECALCIFEROL, VITAMIN D3, (VITAMIN D3 ORAL)] Take by mouth., Disp: , Rfl:      Cyanocobalamin (VITAMIN B-12 PO), , Disp: , Rfl:      hydroCHLOROthiazide (HYDRODIURIL) 25 MG tablet, [HYDROCHLOROTHIAZIDE (HYDRODIURIL) 25 MG TABLET] Take 1 tablet (25 mg total) by mouth daily., Disp: 90 tablet, Rfl: 3     hydrocortisone 2.5 % ointment, , Disp: , Rfl:      potassium citrate (UROCIT-K) 10 mEq (1,080 mg) SR tablet, [POTASSIUM CITRATE (UROCIT-K) 10 MEQ (1,080 MG) SR TABLET] Take 2 tablets (20 mEq total) by mouth 2 (two) times a day., Disp: 360 tablet, Rfl: 3      Objective:  Vitals:    09/08/21 1409   BP: 112/68   Pulse: 68   Resp: 12   Temp: 98.2  F (36.8  C)   TempSrc: Tympanic   Weight: 86.7 kg (191 lb 2 oz)   Height: 1.588 m (5' 2.5\")       Body mass index is 34.4 kg/m .    Vital signs reviewed and stable  General: No acute distress  Psych: Appropriate affect  HEENT: moist mucous membranes, " pupils equal, round, reactive to light and accomodation, tympanic membranes are pearly grey bilaterally  Lymph: no cervical or supraclavicular lymphadenopathy  Cardiovascular: regular rate and rhythm with no murmur  Pulmonary: clear to auscultation bilaterally with no wheeze  Abdomen: soft, non tender, non distended with normo-active bowel sounds  Extremities: warm and well perfused with no edema  Skin: warm and dry with no rash         This note has been dictated and transcribed using voice recognition software.   Any errors in transcription are unintentional and inherent to the software.

## 2021-09-29 ENCOUNTER — TRANSFERRED RECORDS (OUTPATIENT)
Dept: HEALTH INFORMATION MANAGEMENT | Facility: CLINIC | Age: 66
End: 2021-09-29

## 2021-11-29 ENCOUNTER — OFFICE VISIT (OUTPATIENT)
Dept: FAMILY MEDICINE | Facility: CLINIC | Age: 66
End: 2021-11-29
Payer: COMMERCIAL

## 2021-11-29 VITALS
WEIGHT: 192.13 LBS | BODY MASS INDEX: 35.36 KG/M2 | HEIGHT: 62 IN | RESPIRATION RATE: 12 BRPM | SYSTOLIC BLOOD PRESSURE: 138 MMHG | DIASTOLIC BLOOD PRESSURE: 84 MMHG | HEART RATE: 68 BPM | TEMPERATURE: 98 F

## 2021-11-29 DIAGNOSIS — Z13.220 LIPID SCREENING: ICD-10-CM

## 2021-11-29 DIAGNOSIS — Z23 HIGH PRIORITY FOR 2019-NCOV VACCINE: ICD-10-CM

## 2021-11-29 DIAGNOSIS — Z00.00 ENCOUNTER FOR MEDICARE ANNUAL WELLNESS EXAM: Primary | ICD-10-CM

## 2021-11-29 DIAGNOSIS — I10 HYPERTENSION, UNSPECIFIED TYPE: ICD-10-CM

## 2021-11-29 DIAGNOSIS — E78.2 MIXED HYPERLIPIDEMIA: ICD-10-CM

## 2021-11-29 DIAGNOSIS — E66.01 MORBID OBESITY (H): ICD-10-CM

## 2021-11-29 DIAGNOSIS — R42 VERTIGO: ICD-10-CM

## 2021-11-29 DIAGNOSIS — Z78.0 POST-MENOPAUSAL: ICD-10-CM

## 2021-11-29 LAB
ALBUMIN SERPL-MCNC: 3.8 G/DL (ref 3.4–5)
ALP SERPL-CCNC: 78 U/L (ref 40–150)
ALT SERPL W P-5'-P-CCNC: 20 U/L (ref 0–50)
ANION GAP SERPL CALCULATED.3IONS-SCNC: 5 MMOL/L (ref 3–14)
AST SERPL W P-5'-P-CCNC: 14 U/L (ref 0–45)
BILIRUB SERPL-MCNC: 0.6 MG/DL (ref 0.2–1.3)
BUN SERPL-MCNC: 8 MG/DL (ref 7–30)
CALCIUM SERPL-MCNC: 9 MG/DL (ref 8.5–10.1)
CHLORIDE BLD-SCNC: 102 MMOL/L (ref 94–109)
CHOLEST SERPL-MCNC: 163 MG/DL
CO2 SERPL-SCNC: 32 MMOL/L (ref 20–32)
CREAT SERPL-MCNC: 0.57 MG/DL (ref 0.52–1.04)
ERYTHROCYTE [DISTWIDTH] IN BLOOD BY AUTOMATED COUNT: 13.5 % (ref 10–15)
FASTING STATUS PATIENT QL REPORTED: YES
GFR SERPL CREATININE-BSD FRML MDRD: >90 ML/MIN/1.73M2
GLUCOSE BLD-MCNC: 101 MG/DL (ref 70–99)
HCT VFR BLD AUTO: 41.3 % (ref 35–47)
HDLC SERPL-MCNC: 58 MG/DL
HGB BLD-MCNC: 14.2 G/DL (ref 11.7–15.7)
LDLC SERPL CALC-MCNC: 90 MG/DL
MCH RBC QN AUTO: 30.3 PG (ref 26.5–33)
MCHC RBC AUTO-ENTMCNC: 34.4 G/DL (ref 31.5–36.5)
MCV RBC AUTO: 88 FL (ref 78–100)
NONHDLC SERPL-MCNC: 105 MG/DL
PLATELET # BLD AUTO: 214 10E3/UL (ref 150–450)
POTASSIUM BLD-SCNC: 3.5 MMOL/L (ref 3.4–5.3)
PROT SERPL-MCNC: 7.5 G/DL (ref 6.8–8.8)
RBC # BLD AUTO: 4.69 10E6/UL (ref 3.8–5.2)
SODIUM SERPL-SCNC: 139 MMOL/L (ref 133–144)
TRIGL SERPL-MCNC: 75 MG/DL
TSH SERPL DL<=0.005 MIU/L-ACNC: 1.13 MU/L (ref 0.4–4)
WBC # BLD AUTO: 5.9 10E3/UL (ref 4–11)

## 2021-11-29 PROCEDURE — 80053 COMPREHEN METABOLIC PANEL: CPT | Performed by: FAMILY MEDICINE

## 2021-11-29 PROCEDURE — 36415 COLL VENOUS BLD VENIPUNCTURE: CPT | Performed by: FAMILY MEDICINE

## 2021-11-29 PROCEDURE — 91300 COVID-19,PF,PFIZER (12+ YRS): CPT | Performed by: FAMILY MEDICINE

## 2021-11-29 PROCEDURE — 0004A COVID-19,PF,PFIZER (12+ YRS): CPT | Performed by: FAMILY MEDICINE

## 2021-11-29 PROCEDURE — 84443 ASSAY THYROID STIM HORMONE: CPT | Performed by: FAMILY MEDICINE

## 2021-11-29 PROCEDURE — 80061 LIPID PANEL: CPT | Performed by: FAMILY MEDICINE

## 2021-11-29 PROCEDURE — 99397 PER PM REEVAL EST PAT 65+ YR: CPT | Performed by: FAMILY MEDICINE

## 2021-11-29 PROCEDURE — 85027 COMPLETE CBC AUTOMATED: CPT | Performed by: FAMILY MEDICINE

## 2021-11-29 RX ORDER — TRIAMCINOLONE ACETONIDE 1 MG/G
OINTMENT TOPICAL
COMMUNITY
Start: 2021-11-08

## 2021-11-29 ASSESSMENT — ENCOUNTER SYMPTOMS
CONSTIPATION: 0
SHORTNESS OF BREATH: 1
MYALGIAS: 0
DYSURIA: 0
JOINT SWELLING: 1
DIZZINESS: 1
HEARTBURN: 0
HEMATOCHEZIA: 0
NAUSEA: 0
HEADACHES: 0
HEMATURIA: 0
SORE THROAT: 0
ABDOMINAL PAIN: 0
ARTHRALGIAS: 1
WEAKNESS: 1
PARESTHESIAS: 0
NERVOUS/ANXIOUS: 0
COUGH: 0
DIARRHEA: 0
EYE PAIN: 0
FEVER: 0
PALPITATIONS: 0
CHILLS: 0
BREAST MASS: 0
FREQUENCY: 0

## 2021-11-29 ASSESSMENT — ACTIVITIES OF DAILY LIVING (ADL): CURRENT_FUNCTION: HOUSEWORK REQUIRES ASSISTANCE

## 2021-11-29 ASSESSMENT — MIFFLIN-ST. JEOR: SCORE: 1354.72

## 2021-11-29 NOTE — PROGRESS NOTES
"  SUBJECTIVE:   Kelsey Acevedo is a 66 year old female who presents for Preventive Visit.    Patient has been advised of split billing requirements and indicates understanding: Yes  Are you in the first 12 months of your Medicare Part B coverage?  No    Answers for HPI/ROS submitted by the patient on 11/29/2021  In general, how would you rate your overall physical health?: good  Frequency of exercise:: 2-3 days/week  Do you usually eat at least 4 servings of fruit and vegetables a day, include whole grains & fiber, and avoid regularly eating high fat or \"junk\" foods? : Yes  Taking medications regularly:: Yes  Medication side effects:: None  Activities of Daily Living: housework requires assistance  Home safety: no safety concerns identified  Hearing Impairment:: difficulty following a conversation in a noisy restaurant or crowded room  In the past 6 months, have you been bothered by leaking of urine?: No  abdominal pain: No  Blood in stool: No  Blood in urine: No  chest pain: No  chills: No  congestion: Yes  constipation: No  cough: No  diarrhea: No  dizziness: Yes  ear pain: No  eye pain: No  nervous/anxious: No  fever: No  frequency: No  genital sores: No  headaches: No  hearing loss: No  heartburn: No  arthralgias: Yes  joint swelling: Yes  peripheral edema: Yes  mood changes: No  myalgias: No  nausea: No  dysuria: No  palpitations: No  Skin sensation changes: No  sore throat: No  urgency: No  rash: No  shortness of breath: Yes  visual disturbance: No  weakness: Yes  pelvic pain: No  vaginal bleeding: No  vaginal discharge: No  tenderness: No  breast mass: No  breast discharge: No  In general, how would you rate your overall mental or emotional health?: excellent  Additional concerns today:: No  Duration of exercise:: 15-30 minutes    Do you feel safe in your environment? Yes    Have you ever done Advance Care Planning? (For example, a Health Directive, POLST, or a discussion with a medical provider or your loved " ones about your wishes): No, advance care planning information given to patient to review.  Patient plans to discuss their wishes with loved ones or provider.      Additional concerns to address?     Fall risk:    Cognitive Screenin) Repeat 3 items (Leader, Season, Table)    2) Clock draw: NORMAL  3) 3 item recall: Recalls 1 object   Results: NORMAL clock, 1-2 items recalled: COGNITIVE IMPAIRMENT LESS LIKELY    Do you have sleep apnea, excessive snoring or daytime drowsiness?: yes    Mammogram - 2021    Dizziness -has been experiencing chronic dizziness.  Has seen neurology for this in the past.  Would like to see the dizziness/balance center.    Reviewed and updated as needed this visit by clinical staff  Tobacco  Allergies  Meds             Reviewed and updated as needed this visit by Provider               Social History     Tobacco Use     Smoking status: Former Smoker     Smokeless tobacco: Never Used   Substance Use Topics     Alcohol use: Not on file                           Current providers sharing in care for this patient include:   Patient Care Team:  Millie De Paz MD as PCP - General (Family Practice)  Ginette Blanco APRN CNP as Assigned PCP    The following health maintenance items are reviewed in Epic and correct as of today:  Health Maintenance   Topic Date Due     DEXA  Never done     ASTHMA ACTION PLAN  Never done     HEPATITIS C SCREENING  Never done     ZOSTER IMMUNIZATION (1 of 2) Never done     LUNG CANCER SCREENING  Never done     FALL RISK ASSESSMENT  11/10/2021     ASTHMA CONTROL TEST  2022     MAMMO SCREENING  2022     MEDICARE ANNUAL WELLNESS VISIT  2022     ANNUAL REVIEW OF HM ORDERS  2022     LIPID  [id number]     ADVANCE CARE PLANNING  2026     DTAP/TDAP/TD IMMUNIZATION (3 - Td or Tdap) 10/06/2027     COLORECTAL CANCER SCREENING  2028     PHQ-2  Completed     INFLUENZA VACCINE  Completed     Pneumococcal Vaccine: 65+ Years  " Completed     COVID-19 Vaccine  Completed     IPV IMMUNIZATION  Aged Out     MENINGITIS IMMUNIZATION  Aged Out     HEPATITIS B IMMUNIZATION  Aged Out     Lab work is in process  COVID booster    ROS:  Constitutional, HEENT, cardiovascular, pulmonary, GI, , musculoskeletal, neuro, skin, endocrine and psych systems are negative, except as otherwise noted.    OBJECTIVE:   /84   Pulse 68   Temp 98  F (36.7  C) (Tympanic)   Resp 12   Ht 1.575 m (5' 2\")   Wt 87.1 kg (192 lb 2 oz)   Breastfeeding No   BMI 35.14 kg/m   Estimated body mass index is 35.14 kg/m  as calculated from the following:    Height as of this encounter: 1.575 m (5' 2\").    Weight as of this encounter: 87.1 kg (192 lb 2 oz).  EXAM:     Physical Exam:  General Appearance: Alert, cooperative, no distress, appears stated age   Head: Normocephalic, without obvious abnormality, atraumatic  Eyes: PERRL, conjunctiva/corneas clear, EOM's intact   Ears: Normal TM's and external ear canals, both ears  Neck: Supple, symmetrical, trachea midline, no adenopathy;  thyroid: not enlarged, symmetric, no tenderness/mass/nodules  Back: Symmetric, no curvature, ROM normal,  Lungs: Clear to auscultation bilaterally, respirations unlabored  Breasts: No breast masses, tenderness, asymmetry, or nipple discharge.  Heart: Regular rate and rhythm, S1 and S2 normal, no murmur, rub, or gallop  Abdomen: Soft, non-tender, bowel sounds active all four quadrants,  no masses, no organomegaly  Extremities: Extremities normal, atraumatic, no cyanosis or edema  Skin: Skin color, texture, turgor normal, no rashes or lesions  Lymph nodes: Cervical, supraclavicular, and axillary nodes normal and   Neurologic: Normal      Diagnostic Test Results:  Labs reviewed in Epic    ASSESSMENT / PLAN:   1. Encounter for Medicare annual wellness exam  - REVIEW OF HEALTH MAINTENANCE PROTOCOL ORDERS    2. Hypertension, unspecified type  - COMPREHENSIVE METABOLIC PANEL; Future  - CBC with " "Platelets; Future  - COMPREHENSIVE METABOLIC PANEL  - CBC with Platelets    3. Vertigo  Referral to dizziness and balance center  - TSH with free T4 reflex; Future  - CBC with Platelets; Future  - TSH with free T4 reflex  - CBC with Platelets  - Adult Neurology Referral; Future    4. Lipid screening  - Lipid panel reflex to direct LDL Fasting; Future  - Lipid panel reflex to direct LDL Fasting    5. Morbid obesity (H)    6. Mixed hyperlipidemia   - TSH with free T4 reflex; Future  - TSH with free T4 reflex    7. Post-menopausal  - DX Hip/Pelvis/Spine; Future    8. High priority for 2019-nCoV vaccine  - COVID-19,PF,PFIZER (12+ Yrs PURPLE LABEL)      Patient has been advised of split billing requirements and indicates understanding: Yes    COUNSELING:  Reviewed preventive health counseling, as reflected in patient instructions    Estimated body mass index is 35.14 kg/m  as calculated from the following:    Height as of this encounter: 1.575 m (5' 2\").    Weight as of this encounter: 87.1 kg (192 lb 2 oz).    Weight management plan: Discussed healthy diet and exercise guidelines    She reports that she has quit smoking. She has never used smokeless tobacco.    Appropriate preventive services were discussed with this patient, including applicable screening as appropriate for cardiovascular disease, diabetes, osteopenia/osteoporosis, and glaucoma.  As appropriate for age/gender, discussed screening for colorectal cancer, prostate cancer, breast cancer, and cervical cancer. Checklist reviewing preventive services available has been given to the patient.    Reviewed patients plan of care and provided an AVS. The Basic Care Plan (routine screening as documented in Health Maintenance) for Kelsey meets the Care Plan requirement. This Care Plan has been established and reviewed with the Patient.    Counseling Resources:  ATP IV Guidelines  Pooled Cohorts Equation Calculator  Breast Cancer Risk Calculator  BRCA-Related Cancer " Risk Assessment: FHS-7 Tool  FRAX Risk Assessment  ICSI Preventive Guidelines  Dietary Guidelines for Americans, 2010  USDA's MyPlate  ASA Prophylaxis  Lung CA Screening    Millie De Paz MD  [facility]

## 2021-11-29 NOTE — PATIENT INSTRUCTIONS
Patient Education   Personalized Prevention Plan  You are due for the preventive services outlined below.  Your care team is available to assist you in scheduling these services.  If you have already completed any of these items, please share that information with your care team to update in your medical record.  Health Maintenance Due   Topic Date Due     Osteoporosis Screening  Never done     ANNUAL REVIEW OF HM ORDERS  Never done     Asthma Action Plan - yearly  Never done     Discuss Advance Care Planning  Never done     Hepatitis C Screening  Never done     Zoster (Shingles) Vaccine (1 of 2) Never done     LUNG CANCER SCREENING  Never done     Mammogram  12/06/2019     COVID-19 Vaccine (3 - Booster for Pfizer series) 10/02/2021     FALL RISK ASSESSMENT  11/10/2021

## 2021-12-21 ENCOUNTER — ANCILLARY PROCEDURE (OUTPATIENT)
Dept: BONE DENSITY | Facility: CLINIC | Age: 66
End: 2021-12-21
Attending: FAMILY MEDICINE
Payer: COMMERCIAL

## 2021-12-21 DIAGNOSIS — Z78.0 POST-MENOPAUSAL: ICD-10-CM

## 2021-12-21 PROCEDURE — 77080 DXA BONE DENSITY AXIAL: CPT | Mod: TC | Performed by: RADIOLOGY

## 2022-01-11 DIAGNOSIS — I10 HTN (HYPERTENSION): ICD-10-CM

## 2022-01-14 RX ORDER — CARVEDILOL 12.5 MG/1
TABLET ORAL
Qty: 180 TABLET | Refills: 1 | Status: SHIPPED | OUTPATIENT
Start: 2022-01-14 | End: 2022-07-13

## 2022-01-14 RX ORDER — HYDROCHLOROTHIAZIDE 25 MG/1
TABLET ORAL
Qty: 90 TABLET | Refills: 1 | Status: SHIPPED | OUTPATIENT
Start: 2022-01-14 | End: 2022-07-13

## 2022-05-27 DIAGNOSIS — I10 HTN (HYPERTENSION): ICD-10-CM

## 2022-05-27 RX ORDER — POTASSIUM CITRATE 10 MEQ/1
TABLET, EXTENDED RELEASE ORAL
Qty: 360 TABLET | Refills: 3 | Status: SHIPPED | OUTPATIENT
Start: 2022-05-27 | End: 2023-06-30

## 2022-05-27 NOTE — TELEPHONE ENCOUNTER
Routing refill request to provider for review/approval because:  Drug not on the [facility] refill protocol     Manuel Serrano RN

## 2022-06-21 ENCOUNTER — TRANSFERRED RECORDS (OUTPATIENT)
Dept: HEALTH INFORMATION MANAGEMENT | Facility: CLINIC | Age: 67
End: 2022-06-21

## 2022-06-27 ENCOUNTER — TRANSFERRED RECORDS (OUTPATIENT)
Dept: HEALTH INFORMATION MANAGEMENT | Facility: CLINIC | Age: 67
End: 2022-06-27

## 2022-07-11 DIAGNOSIS — I10 HYPERTENSION, UNSPECIFIED TYPE: ICD-10-CM

## 2022-07-11 DIAGNOSIS — I10 HTN (HYPERTENSION): ICD-10-CM

## 2022-07-13 RX ORDER — HYDROCHLOROTHIAZIDE 25 MG/1
TABLET ORAL
Qty: 90 TABLET | Refills: 1 | Status: SHIPPED | OUTPATIENT
Start: 2022-07-13 | End: 2023-01-16

## 2022-07-13 RX ORDER — CARVEDILOL 12.5 MG/1
TABLET ORAL
Qty: 180 TABLET | Refills: 1 | Status: SHIPPED | OUTPATIENT
Start: 2022-07-13 | End: 2023-01-16

## 2022-07-13 RX ORDER — AMLODIPINE BESYLATE 2.5 MG/1
TABLET ORAL
Qty: 90 TABLET | Refills: 1 | Status: SHIPPED | OUTPATIENT
Start: 2022-07-13 | End: 2023-01-16

## 2022-09-12 ENCOUNTER — TRANSFERRED RECORDS (OUTPATIENT)
Dept: HEALTH INFORMATION MANAGEMENT | Facility: CLINIC | Age: 67
End: 2022-09-12

## 2022-09-14 ENCOUNTER — MYC MEDICAL ADVICE (OUTPATIENT)
Dept: FAMILY MEDICINE | Facility: CLINIC | Age: 67
End: 2022-09-14

## 2022-10-17 ENCOUNTER — E-VISIT (OUTPATIENT)
Dept: FAMILY MEDICINE | Facility: CLINIC | Age: 67
End: 2022-10-17
Payer: COMMERCIAL

## 2022-10-17 DIAGNOSIS — L30.9 DERMATITIS: Primary | ICD-10-CM

## 2022-10-17 PROCEDURE — 99421 OL DIG E/M SVC 5-10 MIN: CPT | Performed by: FAMILY MEDICINE

## 2022-10-17 RX ORDER — HYDROCORTISONE 25 MG/G
OINTMENT TOPICAL 2 TIMES DAILY
Qty: 30 G | Refills: 1 | Status: SHIPPED | OUTPATIENT
Start: 2022-10-17 | End: 2024-04-26

## 2022-11-08 RX ORDER — GABAPENTIN 100 MG/1
CAPSULE ORAL
COMMUNITY
Start: 2021-03-17 | End: 2022-11-09

## 2022-11-08 RX ORDER — MULTIPLE VITAMINS W/ MINERALS TAB 9MG-400MCG
1 TAB ORAL
COMMUNITY
End: 2022-11-09

## 2022-11-09 ENCOUNTER — OFFICE VISIT (OUTPATIENT)
Dept: FAMILY MEDICINE | Facility: CLINIC | Age: 67
End: 2022-11-09
Payer: COMMERCIAL

## 2022-11-09 VITALS
OXYGEN SATURATION: 98 % | HEIGHT: 62 IN | BODY MASS INDEX: 35.7 KG/M2 | DIASTOLIC BLOOD PRESSURE: 78 MMHG | RESPIRATION RATE: 12 BRPM | WEIGHT: 194 LBS | SYSTOLIC BLOOD PRESSURE: 112 MMHG | HEART RATE: 72 BPM | TEMPERATURE: 98.2 F

## 2022-11-09 DIAGNOSIS — Z11.59 NEED FOR HEPATITIS C SCREENING TEST: ICD-10-CM

## 2022-11-09 DIAGNOSIS — I10 HYPERTENSION, UNSPECIFIED TYPE: ICD-10-CM

## 2022-11-09 DIAGNOSIS — Z13.220 SCREENING FOR HYPERLIPIDEMIA: ICD-10-CM

## 2022-11-09 DIAGNOSIS — Z00.00 ENCOUNTER FOR MEDICARE ANNUAL WELLNESS EXAM: Primary | ICD-10-CM

## 2022-11-09 DIAGNOSIS — E66.01 MORBID OBESITY (H): ICD-10-CM

## 2022-11-09 DIAGNOSIS — L65.9 HAIR LOSS: ICD-10-CM

## 2022-11-09 DIAGNOSIS — Z82.49 FAMILY HISTORY OF ISCHEMIC HEART DISEASE: ICD-10-CM

## 2022-11-09 LAB
ANION GAP SERPL CALCULATED.3IONS-SCNC: 13 MMOL/L (ref 7–15)
BUN SERPL-MCNC: 7.6 MG/DL (ref 8–23)
CALCIUM SERPL-MCNC: 9.5 MG/DL (ref 8.8–10.2)
CHLORIDE SERPL-SCNC: 98 MMOL/L (ref 98–107)
CHOLEST SERPL-MCNC: 153 MG/DL
CREAT SERPL-MCNC: 0.63 MG/DL (ref 0.51–0.95)
DEPRECATED HCO3 PLAS-SCNC: 29 MMOL/L (ref 22–29)
GFR SERPL CREATININE-BSD FRML MDRD: >90 ML/MIN/1.73M2
GLUCOSE SERPL-MCNC: 100 MG/DL (ref 70–99)
HDLC SERPL-MCNC: 53 MG/DL
LDLC SERPL CALC-MCNC: 86 MG/DL
NONHDLC SERPL-MCNC: 100 MG/DL
POTASSIUM SERPL-SCNC: 3.3 MMOL/L (ref 3.4–5.3)
SODIUM SERPL-SCNC: 140 MMOL/L (ref 136–145)
TRIGL SERPL-MCNC: 69 MG/DL
TSH SERPL DL<=0.005 MIU/L-ACNC: 1.14 UIU/ML (ref 0.3–4.2)

## 2022-11-09 PROCEDURE — 80061 LIPID PANEL: CPT | Performed by: FAMILY MEDICINE

## 2022-11-09 PROCEDURE — 84443 ASSAY THYROID STIM HORMONE: CPT | Performed by: FAMILY MEDICINE

## 2022-11-09 PROCEDURE — 86803 HEPATITIS C AB TEST: CPT | Performed by: FAMILY MEDICINE

## 2022-11-09 PROCEDURE — 99214 OFFICE O/P EST MOD 30 MIN: CPT | Mod: 25 | Performed by: FAMILY MEDICINE

## 2022-11-09 PROCEDURE — 80048 BASIC METABOLIC PNL TOTAL CA: CPT | Performed by: FAMILY MEDICINE

## 2022-11-09 PROCEDURE — 36415 COLL VENOUS BLD VENIPUNCTURE: CPT | Performed by: FAMILY MEDICINE

## 2022-11-09 PROCEDURE — G0439 PPPS, SUBSEQ VISIT: HCPCS | Performed by: FAMILY MEDICINE

## 2022-11-09 ASSESSMENT — ASTHMA QUESTIONNAIRES
QUESTION_1 LAST FOUR WEEKS HOW MUCH OF THE TIME DID YOUR ASTHMA KEEP YOU FROM GETTING AS MUCH DONE AT WORK, SCHOOL OR AT HOME: A LITTLE OF THE TIME
ACT_TOTALSCORE: 21
QUESTION_4 LAST FOUR WEEKS HOW OFTEN HAVE YOU USED YOUR RESCUE INHALER OR NEBULIZER MEDICATION (SUCH AS ALBUTEROL): TWO OR THREE TIMES PER WEEK
QUESTION_5 LAST FOUR WEEKS HOW WOULD YOU RATE YOUR ASTHMA CONTROL: COMPLETELY CONTROLLED
ACT_TOTALSCORE: 21
QUESTION_2 LAST FOUR WEEKS HOW OFTEN HAVE YOU HAD SHORTNESS OF BREATH: ONCE OR TWICE A WEEK
QUESTION_3 LAST FOUR WEEKS HOW OFTEN DID YOUR ASTHMA SYMPTOMS (WHEEZING, COUGHING, SHORTNESS OF BREATH, CHEST TIGHTNESS OR PAIN) WAKE YOU UP AT NIGHT OR EARLIER THAN USUAL IN THE MORNING: NOT AT ALL

## 2022-11-09 ASSESSMENT — ENCOUNTER SYMPTOMS
EYE PAIN: 0
BREAST MASS: 0
SHORTNESS OF BREATH: 0
CHILLS: 0
HEARTBURN: 0
DYSURIA: 0
DIARRHEA: 0
SORE THROAT: 0
NERVOUS/ANXIOUS: 0
WEAKNESS: 0
FREQUENCY: 0
JOINT SWELLING: 1
HEMATOCHEZIA: 0
COUGH: 0
HEMATURIA: 0
PARESTHESIAS: 0
MYALGIAS: 0
ARTHRALGIAS: 1
NAUSEA: 0
PALPITATIONS: 0
ABDOMINAL PAIN: 0
HEADACHES: 0
CONSTIPATION: 0
DIZZINESS: 0
FEVER: 0

## 2022-11-09 ASSESSMENT — ACTIVITIES OF DAILY LIVING (ADL): CURRENT_FUNCTION: NO ASSISTANCE NEEDED

## 2022-11-09 NOTE — PATIENT INSTRUCTIONS
Patient Education   Personalized Prevention Plan  You are due for the preventive services outlined below.  Your care team is available to assist you in scheduling these services.  If you have already completed any of these items, please share that information with your care team to update in your medical record.  Health Maintenance Due   Topic Date Due     Asthma Action Plan - yearly  Never done     Hepatitis C Screening  Never done     LUNG CANCER SCREENING  Never done     FALL RISK ASSESSMENT  11/10/2021     PHQ-2 (once per calendar year)  01/01/2022     Asthma Control Test  03/08/2022     Mammogram  09/01/2022     ANNUAL REVIEW OF HM ORDERS  11/29/2022

## 2022-11-09 NOTE — PROGRESS NOTES
"SUBJECTIVE:   Kelsey is a 67 year old who presents for Preventive Visit.    Patient has been advised of split billing requirements and indicates understanding: Yes  Are you in the first 12 months of your Medicare coverage?  No    Healthy Habits:     In general, how would you rate your overall health?  Good    Frequency of exercise:  1 day/week    Duration of exercise:  Less than 15 minutes    Do you usually eat at least 4 servings of fruit and vegetables a day, include whole grains    & fiber and avoid regularly eating high fat or \"junk\" foods?  Yes    Taking medications regularly:  Yes    Medication side effects:  None    Ability to successfully perform activities of daily living:  No assistance needed    Home Safety:  No safety concerns identified    Hearing Impairment:  No hearing concerns    In the past 6 months, have you been bothered by leaking of urine?  No    In general, how would you rate your overall mental or emotional health?  Excellent      PHQ-2 Total Score: 0    Additional concerns today:  No    Do you feel safe in your environment? Yes    Have you ever done Advance Care Planning? (For example, a Health Directive, POLST, or a discussion with a medical provider or your loved ones about your wishes): No, advance care planning information given to patient to review.  Patient plans to discuss their wishes with loved ones or provider.      Fall risk  Fallen 2 or more times in the past year?: No  Any fall with injury in the past year?: No    Cognitive Screening   1) Repeat 3 items (Leader, Season, Table)    2) Clock draw: NORMAL  3) 3 item recall: Recalls 2 objects   Results: NORMAL clock, 1-2 items recalled: COGNITIVE IMPAIRMENT LESS LIKELY    Mini-CogTM Copyright JOHANNA Morris. Licensed by the author for use in [facility]; reprinted with permission (kofi@.[facility]). All rights reserved.      Do you have sleep apnea, excessive snoring or daytime drowsiness?: yes    Reviewed and updated as needed this " visit by clinical staff   Tobacco  Allergies  Meds              Reviewed and updated as needed this visit by Provider                 Social History     Tobacco Use     Smoking status: Former     Smokeless tobacco: Never   Substance Use Topics     Alcohol use: Not on file         Alcohol Use 2022   Prescreen: >3 drinks/day or >7 drinks/week? No     Hair loss: Patient notes that she has been losing some hair recently.  Her sister, who is her hairstylist, mentions that she loses about 3 times as much years anyone else when she washes the patient's hair.  Patient does note that her hair has been thinning a little.  She does not note any specific areas of thinning.    Family history of heart disease: Patient's father  of coronary artery disease.  Patient's brother recently had coronary artery bypass after heart attack.  Patient does not have any chest pain or shortness of breath.  She notes that many years ago she had some palpitations.  Holter monitor was done which was normal per her report.  These have for the most part resolved and she does not notice them anymore.  She has not noticed any swelling in her legs.    Current providers sharing in care for this patient include:   Patient Care Team:  Millie De Paz MD as PCP - General (Family Practice)  Millie De Paz MD as Assigned PCP    The following health maintenance items are reviewed in Epic and correct as of today:  Health Maintenance   Topic Date Due     ASTHMA ACTION PLAN  Never done     HEPATITIS C SCREENING  Never done     LUNG CANCER SCREENING  Never done     MAMMO SCREENING  2022     ASTHMA CONTROL TEST  2023     MEDICARE ANNUAL WELLNESS VISIT  2023     ANNUAL REVIEW OF HM ORDERS  2023     FALL RISK ASSESSMENT  2023     LIPID  2026     DTAP/TDAP/TD IMMUNIZATION (3 - Td or Tdap) 10/06/2027     ADVANCE CARE PLANNING  2027     COLORECTAL CANCER SCREENING  2028     DEXA  2036      "PHQ-2 (once per calendar year)  Completed     INFLUENZA VACCINE  Completed     Pneumococcal Vaccine: 65+ Years  Completed     ZOSTER IMMUNIZATION  Completed     COVID-19 Vaccine  Completed     IPV IMMUNIZATION  Aged Out     MENINGITIS IMMUNIZATION  Aged Out     Lab work is in process  Mammogram Screening: will get records    Breast CA Risk Assessment (FHS-7) 11/29/2021   Do you have a family history of breast, colon, or ovarian cancer? No / Unknown       Mammogram - at outside location - 9/15/22    Review of Systems  Constitutional, HEENT, cardiovascular, pulmonary, GI, , musculoskeletal, neuro, skin, endocrine and psych systems are negative, except as otherwise noted.    OBJECTIVE:   /78   Pulse 72   Temp 98.2  F (36.8  C) (Tympanic)   Resp 12   Ht 1.575 m (5' 2\")   Wt 88 kg (194 lb)   SpO2 98%   BMI 35.48 kg/m   Estimated body mass index is 35.48 kg/m  as calculated from the following:    Height as of this encounter: 1.575 m (5' 2\").    Weight as of this encounter: 88 kg (194 lb).  Physical Exam    Physical Exam:  General Appearance: Alert, cooperative, no distress, appears stated age   Head: Normocephalic, without obvious abnormality, atraumatic  Eyes: PERRL, conjunctiva/corneas clear, EOM's intact   Ears: Normal TM's and external ear canals, both ears  Neck: Supple, symmetrical, trachea midline, no adenopathy;  thyroid: not enlarged, symmetric, no tenderness/mass/nodules  Back: Symmetric, no curvature, ROM normal,  Lungs: Clear to auscultation bilaterally, respirations unlabored  Breasts: No breast masses, tenderness, asymmetry, or nipple discharge.  Heart: Regular rate and rhythm, S1 and S2 normal, no murmur, rub, or gallop  Abdomen: Soft, non-tender, bowel sounds active all four quadrants,  no masses, no organomegaly  Extremities: Extremities normal, atraumatic, no cyanosis or edema  Skin: Skin color, texture, turgor normal, no rashes or lesions  Lymph nodes: Cervical, supraclavicular, and " "axillary nodes normal and   Neurologic: Normal        ASSESSMENT / PLAN:   1. Encounter for Medicare annual wellness exam  - REVIEW OF HEALTH MAINTENANCE PROTOCOL ORDERS    2. Hair loss  Etiology is unclear.  No specific patches.  Laboratory testing below will be done  - BASIC METABOLIC PANEL; Future  - TSH with free T4 reflex; Future  - TSH with free T4 reflex  - BASIC METABOLIC PANEL    3. Screening for hyperlipidemia  - Lipid panel reflex to direct LDL Fasting; Future  - Lipid panel reflex to direct LDL Fasting    4. Family history of ischemic heart disease  After discussion with the patient we have decided to pursue a CT coronary calcium scan to further define her risk.  - CT Coronary Calcium Scan; Future    5. Morbid obesity (H)  BMI of over 35 with hypertension which is well controlled.  Discussed diet and exercise.    6. Hypertension, unspecified type  Blood pressure well controlled on current medication.  Patient does not need a refill at this time.  - BASIC METABOLIC PANEL; Future  - BASIC METABOLIC PANEL    7. Need for hepatitis C screening test  - Hepatitis C Screen Reflex to HCV RNA Quant and Genotype; Future  - Hepatitis C Screen Reflex to HCV RNA Quant and Genotype      Patient has been advised of split billing requirements and indicates understanding: Yes      COUNSELING:  Reviewed preventive health counseling, as reflected in patient instructions    Estimated body mass index is 35.48 kg/m  as calculated from the following:    Height as of this encounter: 1.575 m (5' 2\").    Weight as of this encounter: 88 kg (194 lb).    Weight management plan: Discussed healthy diet and exercise guidelines    She reports that she has quit smoking. She has never used smokeless tobacco.      Appropriate preventive services were discussed with this patient, including applicable screening as appropriate for cardiovascular disease, diabetes, osteopenia/osteoporosis, and glaucoma.  As appropriate for age/gender, discussed " screening for colorectal cancer, prostate cancer, breast cancer, and cervical cancer. Checklist reviewing preventive services available has been given to the patient.    Reviewed patients plan of care and provided an AVS. The Basic Care Plan (routine screening as documented in Health Maintenance) for Kelsey meets the Care Plan requirement. This Care Plan has been established and reviewed with the Patient.    Counseling Resources:  ATP IV Guidelines  Pooled Cohorts Equation Calculator  Breast Cancer Risk Calculator  Breast Cancer: Medication to Reduce Risk  FRAX Risk Assessment  ICSI Preventive Guidelines  Dietary Guidelines for Americans, 2010  USDA's MyPlate  ASA Prophylaxis  Lung CA Screening    Millie De Paz MD  [facility]    Identified Health Risks:

## 2022-11-10 LAB — HCV AB SERPL QL IA: NONREACTIVE

## 2022-11-17 ENCOUNTER — HOSPITAL ENCOUNTER (OUTPATIENT)
Dept: CT IMAGING | Facility: CLINIC | Age: 67
Discharge: HOME | End: 2022-11-17
Attending: FAMILY MEDICINE | Admitting: FAMILY MEDICINE

## 2022-11-17 DIAGNOSIS — Z82.49 FAMILY HISTORY OF ISCHEMIC HEART DISEASE: ICD-10-CM

## 2022-11-17 PROCEDURE — 75571 CT HRT W/O DYE W/CA TEST: CPT | Mod: 26 | Performed by: INTERNAL MEDICINE

## 2022-11-17 PROCEDURE — 75571 CT HRT W/O DYE W/CA TEST: CPT

## 2022-11-22 ENCOUNTER — TRANSFERRED RECORDS (OUTPATIENT)
Dept: HEALTH INFORMATION MANAGEMENT | Facility: CLINIC | Age: 67
End: 2022-11-22

## 2022-12-28 ENCOUNTER — TRANSFERRED RECORDS (OUTPATIENT)
Dept: HEALTH INFORMATION MANAGEMENT | Facility: CLINIC | Age: 67
End: 2022-12-28

## 2022-12-29 ENCOUNTER — TRANSFERRED RECORDS (OUTPATIENT)
Dept: HEALTH INFORMATION MANAGEMENT | Facility: CLINIC | Age: 67
End: 2022-12-29

## 2023-01-23 ENCOUNTER — TRANSFERRED RECORDS (OUTPATIENT)
Dept: HEALTH INFORMATION MANAGEMENT | Facility: CLINIC | Age: 68
End: 2023-01-23

## 2023-02-10 ENCOUNTER — VIRTUAL VISIT (OUTPATIENT)
Dept: URGENT CARE | Facility: CLINIC | Age: 68
End: 2023-02-10
Payer: COMMERCIAL

## 2023-02-10 DIAGNOSIS — U07.1 INFECTION DUE TO 2019 NOVEL CORONAVIRUS: Primary | ICD-10-CM

## 2023-02-10 PROCEDURE — 99213 OFFICE O/P EST LOW 20 MIN: CPT | Mod: CS

## 2023-02-10 RX ORDER — ROSUVASTATIN CALCIUM 10 MG/1
1 TABLET, COATED ORAL DAILY
COMMUNITY
Start: 2023-01-19

## 2023-02-10 NOTE — PROGRESS NOTES
"Assessment & Plan     Infection due to 2019 novel coronavirus  - nirmatrelvir and ritonavir (PAXLOVID) therapy pack; Take 3 tablets by mouth 2 times daily for 5 days      Return in about 1 week (around 2/17/2023) for visit with primary care provider if not improving.   COVID-19 positive patient.  Encounter for consideration of medication intervention. Patient does qualify for a prescription. Full discussion with patient including medication options, risks and benefits. Potential drug interactions reviewed with patient.     Treatment Planned: Paxlovid  Temporary change to home medications: Hold atorvastatin and amlodipine    Estimated body mass index is 35.48 kg/m  as calculated from the following:    Height as of 11/9/22: 1.575 m (5' 2\").    Weight as of 11/9/22: 88 kg (194 lb).  GFR Estimate   Date Value Ref Range Status   11/09/2022 >90 >60 mL/min/1.73m2 Final     Comment:     Effective December 21, 2021 eGFRcr in adults is calculated using the 2021 CKD-EPI creatinine equation which includes age and gender (Jesica et al., NEJ, DOI: 10.1056/ADXZii9736661)   11/10/2020 >60 >60 mL/min/1.73m2 Final     ALT   Date Value Ref Range Status   11/29/2021 20 0 - 50 U/L Final     AST   Date Value Ref Range Status   11/29/2021 14 0 - 45 U/L Final     Alkaline Phosphatase   Date Value Ref Range Status   11/29/2021 78 40 - 150 U/L Final     No results found for: [id number]    OBDULIA Tavares [facility]    Subjective   Kelsey Acevedo is a 67 year old who presents for the following health issues    HPI    Kelsey presents via telephone after testing positive for COVID-19.  Symptoms first began 2 days ago and she feels like she is getting a little worse with time.  She has had chills, fever up to 101F, a cough that feels like it is coming from her chest associated with some shortness of breath, body aches and a scratchy throat.  She has been using her albuterol inhaler this is helping with the " shortness of breath.    Review of Systems   ROS negative except as stated above.      Objective    Physical Exam   healthy, alert and no distress  PSYCH: Alert and oriented times 3; coherent speech, normal   rate and volume, able to articulate logical thoughts, able   to abstract reason, no tangential thoughts, no hallucinations   or delusions. Affect is normal and pleasant  RESP: No cough, no audible wheezing, able to talk in full sentences  Remainder of exam unable to be completed due to telephone visits    Call duration: 10 min  Provider location: offsite at home, [facility]

## 2023-02-10 NOTE — PATIENT INSTRUCTIONS
Symptoms began February 8  Stay home and away from others through February 13  You may be around others wearing a well fitting mask on February 14- February 18  Your isolation restrictions are over on February 19 as long as your symptoms are improving and you have been fever free for 24 hours, even if you still test positive for COVID.  However, if you test negative twice, at least 48 hours apart between days 6-10, you can stop wearing your mask earlier    Visit the CDC websites for more information and most up to date guidelines:  www.cdc.gov/coronavirus/2019-ncov/your-health/isolation.html  www.cdc.gov/coronavirus/2019-ncov/hcp/duration-isolation.html    Hold atorvastatin, restart the day after you finish Paxlovid  Hold amlodapine and restart February 18    Check O2 levels. If your number stays at 90 or below at rest, go to the emergency room.     negative - no pain, no limited range of motion

## 2023-04-16 DIAGNOSIS — I10 HYPERTENSION, UNSPECIFIED TYPE: ICD-10-CM

## 2023-04-16 DIAGNOSIS — I10 HTN (HYPERTENSION): ICD-10-CM

## 2023-04-17 RX ORDER — CARVEDILOL 12.5 MG/1
TABLET ORAL
Qty: 180 TABLET | Refills: 0 | Status: SHIPPED | OUTPATIENT
Start: 2023-04-17 | End: 2023-07-20

## 2023-04-17 RX ORDER — AMLODIPINE BESYLATE 2.5 MG/1
TABLET ORAL
Qty: 90 TABLET | Refills: 0 | Status: SHIPPED | OUTPATIENT
Start: 2023-04-17 | End: 2023-07-20

## 2023-06-30 DIAGNOSIS — I10 HTN (HYPERTENSION): ICD-10-CM

## 2023-06-30 RX ORDER — POTASSIUM CITRATE 10 MEQ/1
TABLET, EXTENDED RELEASE ORAL
Qty: 360 TABLET | Refills: 3 | Status: SHIPPED | OUTPATIENT
Start: 2023-06-30 | End: 2024-09-12

## 2023-07-20 DIAGNOSIS — I10 HYPERTENSION, UNSPECIFIED TYPE: ICD-10-CM

## 2023-07-20 DIAGNOSIS — I10 HTN (HYPERTENSION): ICD-10-CM

## 2023-07-20 RX ORDER — CARVEDILOL 12.5 MG/1
TABLET ORAL
Qty: 180 TABLET | Refills: 0 | Status: SHIPPED | OUTPATIENT
Start: 2023-07-20 | End: 2023-10-18

## 2023-07-20 RX ORDER — AMLODIPINE BESYLATE 2.5 MG/1
TABLET ORAL
Qty: 90 TABLET | Refills: 0 | Status: SHIPPED | OUTPATIENT
Start: 2023-07-20 | End: 2023-10-18

## 2023-07-20 NOTE — TELEPHONE ENCOUNTER
"Prescription approved per [facility] Refill Protocol.       Requested Prescriptions   Signed Prescriptions Disp Refills    amLODIPine (NORVASC) 2.5 MG tablet 90 tablet 0     Sig: TAKE ONE TABLET BY MOUTH ONCE DAILY       Calcium Channel Blockers Protocol  Passed - 7/20/2023  2:39 AM        Passed - Blood pressure under 140/90 in past 12 months     BP Readings from Last 3 Encounters:   11/09/22 112/78   11/29/21 138/84   09/08/21 112/68                 Passed - Recent (12 mo) or future (30 days) visit within the authorizing provider's specialty     Patient has had an office visit with the authorizing provider or a provider within the authorizing providers department within the previous 12 mos or has a future within next 30 days. See \"Patient Info\" tab in inbasket, or \"Choose Columns\" in Meds & Orders section of the refill encounter.              Passed - Medication is active on med list        Passed - Patient is age 18 or older        Passed - No active pregnancy on record        Passed - Normal serum creatinine on file in past 12 months     Recent Labs   Lab Test 11/09/22  1353   CR 0.63       Ok to refill medication if creatinine is low          Passed - No positive pregnancy test in past 12 months          carvedilol (COREG) 12.5 MG tablet 180 tablet 0     Sig: TAKE ONE TABLET BY MOUTH TWICE A DAY WITH MEALS       Beta-Blockers Protocol Passed - 7/20/2023  2:39 AM        Passed - Blood pressure under 140/90 in past 12 months     BP Readings from Last 3 Encounters:   11/09/22 112/78   11/29/21 138/84   09/08/21 112/68                 Passed - Patient is age 6 or older        Passed - Recent (12 mo) or future (30 days) visit within the authorizing provider's specialty     Patient has had an office visit with the authorizing provider or a provider within the authorizing providers department within the previous 12 mos or has a future within next 30 days. See \"Patient Info\" tab in inbasket, or \"Choose Columns\" in Meds & " Orders section of the refill encounter.              Passed - Medication is active on med list                 Manuel Serrano RN 07/20/23 8:34 AM

## 2023-08-16 ENCOUNTER — PATIENT OUTREACH (OUTPATIENT)
Dept: CARE COORDINATION | Facility: CLINIC | Age: 68
End: 2023-08-16
Payer: COMMERCIAL

## 2023-08-28 ENCOUNTER — TRANSFERRED RECORDS (OUTPATIENT)
Dept: HEALTH INFORMATION MANAGEMENT | Facility: CLINIC | Age: 68
End: 2023-08-28
Payer: COMMERCIAL

## 2023-09-13 ENCOUNTER — PATIENT OUTREACH (OUTPATIENT)
Dept: CARE COORDINATION | Facility: CLINIC | Age: 68
End: 2023-09-13
Payer: COMMERCIAL

## 2023-09-19 ENCOUNTER — TRANSFERRED RECORDS (OUTPATIENT)
Dept: MULTI SPECIALTY CLINIC | Facility: CLINIC | Age: 68
End: 2023-09-19

## 2023-10-10 ENCOUNTER — PATIENT OUTREACH (OUTPATIENT)
Dept: CARE COORDINATION | Facility: CLINIC | Age: 68
End: 2023-10-10
Payer: COMMERCIAL

## 2023-11-15 PROBLEM — G62.9 PERIPHERAL NEUROPATHY: Status: ACTIVE | Noted: 2018-10-12

## 2023-11-15 PROBLEM — M19.079 ARTHRITIS OF FOOT: Status: ACTIVE | Noted: 2018-10-12

## 2023-11-15 PROBLEM — I83.90 ASYMPTOMATIC VARICOSE VEINS: Status: ACTIVE | Noted: 2018-10-12

## 2023-11-17 ENCOUNTER — OFFICE VISIT (OUTPATIENT)
Dept: FAMILY MEDICINE | Facility: CLINIC | Age: 68
End: 2023-11-17
Payer: COMMERCIAL

## 2023-11-17 VITALS
DIASTOLIC BLOOD PRESSURE: 68 MMHG | HEART RATE: 65 BPM | RESPIRATION RATE: 12 BRPM | BODY MASS INDEX: 35.43 KG/M2 | OXYGEN SATURATION: 98 % | WEIGHT: 192.5 LBS | TEMPERATURE: 97.7 F | SYSTOLIC BLOOD PRESSURE: 102 MMHG | HEIGHT: 62 IN

## 2023-11-17 DIAGNOSIS — E53.8 VITAMIN B12 DEFICIENCY (NON ANEMIC): ICD-10-CM

## 2023-11-17 DIAGNOSIS — Z29.11 NEED FOR VACCINATION AGAINST RESPIRATORY SYNCYTIAL VIRUS: ICD-10-CM

## 2023-11-17 DIAGNOSIS — Z13.1 SCREENING FOR DIABETES MELLITUS: ICD-10-CM

## 2023-11-17 DIAGNOSIS — E55.9 HYPOVITAMINOSIS D: ICD-10-CM

## 2023-11-17 DIAGNOSIS — Z12.31 VISIT FOR SCREENING MAMMOGRAM: Primary | ICD-10-CM

## 2023-11-17 DIAGNOSIS — E66.01 MORBID OBESITY (H): ICD-10-CM

## 2023-11-17 DIAGNOSIS — I10 PRIMARY HYPERTENSION: ICD-10-CM

## 2023-11-17 DIAGNOSIS — Z00.00 ENCOUNTER FOR MEDICARE ANNUAL WELLNESS EXAM: ICD-10-CM

## 2023-11-17 LAB
ALBUMIN SERPL BCG-MCNC: 4.4 G/DL (ref 3.5–5.2)
ALP SERPL-CCNC: 67 U/L (ref 40–150)
ALT SERPL W P-5'-P-CCNC: 17 U/L (ref 0–50)
ANION GAP SERPL CALCULATED.3IONS-SCNC: 9 MMOL/L (ref 7–15)
AST SERPL W P-5'-P-CCNC: 19 U/L (ref 0–45)
BILIRUB SERPL-MCNC: 0.5 MG/DL
BUN SERPL-MCNC: 11.9 MG/DL (ref 8–23)
CALCIUM SERPL-MCNC: 9.4 MG/DL (ref 8.8–10.2)
CHLORIDE SERPL-SCNC: 102 MMOL/L (ref 98–107)
CHOLEST SERPL-MCNC: 114 MG/DL
CREAT SERPL-MCNC: 0.86 MG/DL (ref 0.51–0.95)
DEPRECATED HCO3 PLAS-SCNC: 31 MMOL/L (ref 22–29)
EGFRCR SERPLBLD CKD-EPI 2021: 72 ML/MIN/1.73M2
ERYTHROCYTE [DISTWIDTH] IN BLOOD BY AUTOMATED COUNT: 13.7 % (ref 10–15)
GLUCOSE SERPL-MCNC: 114 MG/DL (ref 70–99)
HBA1C MFR BLD: 5.3 % (ref 0–5.6)
HCT VFR BLD AUTO: 38.3 % (ref 35–47)
HDLC SERPL-MCNC: 53 MG/DL
HGB BLD-MCNC: 13 G/DL (ref 11.7–15.7)
LDLC SERPL CALC-MCNC: 44 MG/DL
MCH RBC QN AUTO: 29.9 PG (ref 26.5–33)
MCHC RBC AUTO-ENTMCNC: 33.9 G/DL (ref 31.5–36.5)
MCV RBC AUTO: 88 FL (ref 78–100)
NONHDLC SERPL-MCNC: 61 MG/DL
PLATELET # BLD AUTO: 205 10E3/UL (ref 150–450)
POTASSIUM SERPL-SCNC: 3.9 MMOL/L (ref 3.4–5.3)
PROT SERPL-MCNC: 6.7 G/DL (ref 6.4–8.3)
RBC # BLD AUTO: 4.35 10E6/UL (ref 3.8–5.2)
SODIUM SERPL-SCNC: 142 MMOL/L (ref 135–145)
TRIGL SERPL-MCNC: 87 MG/DL
VIT B12 SERPL-MCNC: 1642 PG/ML (ref 232–1245)
VIT D+METAB SERPL-MCNC: 29 NG/ML (ref 20–50)
WBC # BLD AUTO: 5.4 10E3/UL (ref 4–11)

## 2023-11-17 PROCEDURE — 80053 COMPREHEN METABOLIC PANEL: CPT | Performed by: FAMILY MEDICINE

## 2023-11-17 PROCEDURE — 83036 HEMOGLOBIN GLYCOSYLATED A1C: CPT | Mod: GZ | Performed by: FAMILY MEDICINE

## 2023-11-17 PROCEDURE — 85027 COMPLETE CBC AUTOMATED: CPT | Performed by: FAMILY MEDICINE

## 2023-11-17 PROCEDURE — G0439 PPPS, SUBSEQ VISIT: HCPCS | Performed by: FAMILY MEDICINE

## 2023-11-17 PROCEDURE — 36415 COLL VENOUS BLD VENIPUNCTURE: CPT | Performed by: FAMILY MEDICINE

## 2023-11-17 PROCEDURE — 82607 VITAMIN B-12: CPT | Performed by: FAMILY MEDICINE

## 2023-11-17 PROCEDURE — 80061 LIPID PANEL: CPT | Performed by: FAMILY MEDICINE

## 2023-11-17 PROCEDURE — 82306 VITAMIN D 25 HYDROXY: CPT | Performed by: FAMILY MEDICINE

## 2023-11-17 RX ORDER — RESPIRATORY SYNCYTIAL VIRUS VACCINE 120MCG/0.5
0.5 KIT INTRAMUSCULAR ONCE
Qty: 1 EACH | Refills: 0 | Status: CANCELLED | OUTPATIENT
Start: 2023-11-17 | End: 2023-11-17

## 2023-11-17 ASSESSMENT — ENCOUNTER SYMPTOMS
FREQUENCY: 0
ARTHRALGIAS: 0
BREAST MASS: 0
SHORTNESS OF BREATH: 0
SORE THROAT: 0
HEMATURIA: 0
FEVER: 0
MYALGIAS: 0
HEARTBURN: 0
PALPITATIONS: 0
DIARRHEA: 0
HEADACHES: 0
NERVOUS/ANXIOUS: 0
PARESTHESIAS: 0
HEMATOCHEZIA: 0
NAUSEA: 0
DIZZINESS: 0
DYSURIA: 0
JOINT SWELLING: 0
CHILLS: 0
WEAKNESS: 0
ABDOMINAL PAIN: 0
CONSTIPATION: 0
EYE PAIN: 0
COUGH: 0

## 2023-11-17 ASSESSMENT — ACTIVITIES OF DAILY LIVING (ADL): CURRENT_FUNCTION: HOUSEWORK REQUIRES ASSISTANCE

## 2023-11-17 ASSESSMENT — ASTHMA QUESTIONNAIRES: ACT_TOTALSCORE: 17

## 2023-11-17 NOTE — PATIENT INSTRUCTIONS
Patient Education   Personalized Prevention Plan  You are due for the preventive services outlined below.  Your care team is available to assist you in scheduling these services.  If you have already completed any of these items, please share that information with your care team to update in your medical record.  Health Maintenance Due   Topic Date Due     Asthma Action Plan - yearly  Never done     LUNG CANCER SCREENING  Never done     RSV VACCINE (Pregnancy & 60+) (1 - 1-dose 60+ series) Never done     PHQ-2 (once per calendar year)  01/01/2023     Asthma Control Test  05/09/2023     Mammogram  09/15/2023     ANNUAL REVIEW OF HM ORDERS  11/09/2023     FALL RISK ASSESSMENT  11/09/2023

## 2023-11-17 NOTE — PROGRESS NOTES
"SUBJECTIVE:   Kelsey is a 68 year old, presenting for the following:  Medicare Visit (AWV-Px/Not fasting for labs today)    Are you in the first 12 months of your Medicare coverage?  No    Healthy Habits:     In general, how would you rate your overall health?  Good    Frequency of exercise:  1 day/week    Duration of exercise:  Less than 15 minutes    Do you usually eat at least 4 servings of fruit and vegetables a day, include whole grains    & fiber and avoid regularly eating high fat or \"junk\" foods?  Yes    Taking medications regularly:  Yes    Medication side effects:  Muscle aches    Ability to successfully perform activities of daily living:  Housework requires assistance    Home Safety:  No safety concerns identified    Hearing Impairment:  No hearing concerns    In the past 6 months, have you been bothered by leaking of urine?  No    In general, how would you rate your overall mental or emotional health?  Excellent    Additional concerns today:  No    Have you ever done Advance Care Planning? (For example, a Health Directive, POLST, or a discussion with a medical provider or your loved ones about your wishes): No, advance care planning information given to patient to review.  Patient plans to discuss their wishes with loved ones or provider.      Fall risk- No falls in the past 12 months   Fallen 2 or more times in the past year?: No  Any fall with injury in the past year?: No  Cognitive Screening   1) Repeat 3 items (Leader, Season, Table)    2) Clock draw: NORMAL  3) 3 item recall: Recalls 2 objects   Results: NORMAL clock, 1-2 items recalled: COGNITIVE IMPAIRMENT LESS LIKELY    Mini-CogTM Copyright JOHANNA Morris. Licensed by the author for use in [facility]; reprinted with permission ([email or web address]). All rights reserved.      Do you have sleep apnea, excessive snoring or daytime drowsiness? : yes    Reviewed and updated as needed this visit by clinical staff    Allergies  Meds          "     Reviewed and updated as needed this visit by Provider                 Social History     Tobacco Use    Smoking status: Former    Smokeless tobacco: Never   Substance Use Topics    Alcohol use: Not on file             11/17/2023     3:12 PM   Alcohol Use   Prescreen: >3 drinks/day or >7 drinks/week? No     Do you have a current opioid prescription? No  Do you use any other controlled substances or medications that are not prescribed by a provider? None        Is doing very well overall.  She was recently in [place] with her granddaughter for 8 days.  She had a nice time.  She did note that with all the walking and shopping she did have some leg pain but it has improved since she has gotten home.    Otherwise, no specific questions or concerns.    Current providers sharing in care for this patient include:   Patient Care Team:  Millie De Paz MD as PCP - General (Family Practice)  Millie De Paz MD as Assigned PCP    The following health maintenance items are reviewed in Epic and correct as of today:  Health Maintenance   Topic Date Due    ASTHMA ACTION PLAN  Never done    LUNG CANCER SCREENING  Never done    RSV VACCINE (Pregnancy & 60+) (1 - 1-dose 60+ series) Never done    MAMMO SCREENING  09/15/2023    ASTHMA CONTROL TEST  05/17/2024    MEDICARE ANNUAL WELLNESS VISIT  11/17/2024    ANNUAL REVIEW OF HM ORDERS  11/17/2024    FALL RISK ASSESSMENT  11/17/2024    DTAP/TDAP/TD IMMUNIZATION (3 - Td or Tdap) 10/06/2027    LIPID  11/09/2027    COLORECTAL CANCER SCREENING  09/06/2028    ADVANCE CARE PLANNING  11/17/2028    DEXA  12/21/2036    HEPATITIS C SCREENING  Completed    PHQ-2 (once per calendar year)  Completed    INFLUENZA VACCINE  Completed    Pneumococcal Vaccine: 65+ Years  Completed    ZOSTER IMMUNIZATION  Completed    COVID-19 Vaccine  Completed    IPV IMMUNIZATION  Aged Out    HPV IMMUNIZATION  Aged Out    MENINGITIS IMMUNIZATION  Aged Out    RSV MONOCLONAL ANTIBODY  Aged Out  "      Mammogram Screening: Mammogram Screening: Recommended mammography every 1-2 years with patient discussion and risk factor consideration        Review of Systems   Constitutional:  Negative for chills and fever.   HENT:  Negative for congestion, ear pain, hearing loss and sore throat.    Eyes:  Negative for pain and visual disturbance.   Respiratory:  Negative for cough and shortness of breath.    Cardiovascular:  Positive for peripheral edema. Negative for chest pain and palpitations.   Gastrointestinal:  Negative for abdominal pain, constipation, diarrhea, heartburn, hematochezia and nausea.   Breasts:  Negative for tenderness, breast mass and discharge.   Genitourinary:  Negative for dysuria, frequency, genital sores, hematuria, pelvic pain, urgency, vaginal bleeding and vaginal discharge.   Musculoskeletal:  Negative for arthralgias, joint swelling and myalgias.   Skin:  Negative for rash.   Neurological:  Negative for dizziness, weakness, headaches and paresthesias.   Psychiatric/Behavioral:  Negative for mood changes. The patient is not nervous/anxious.          OBJECTIVE:   /68   Pulse 65   Temp 97.7  F (36.5  C) (Tympanic)   Resp 12   Ht 1.575 m (5' 2\")   Wt 87.3 kg (192 lb 8 oz)   SpO2 98%   BMI 35.21 kg/m   Estimated body mass index is 35.21 kg/m  as calculated from the following:    Height as of this encounter: 1.575 m (5' 2\").    Weight as of this encounter: 87.3 kg (192 lb 8 oz).  Physical Exam    Physical Exam:  General Appearance: Alert, cooperative, no distress, appears stated age   Head: Normocephalic, without obvious abnormality, atraumatic  Eyes: PERRL, conjunctiva/corneas clear, EOM's intact   Ears: Normal TM's and external ear canals, both ears  Nose:Nares normal, septum midline,mucosa normal, no drainage    Throat:Lips, mucosa, and tongue normal; teeth and gums normal  Neck: Supple, symmetrical, trachea midline, no adenopathy;  thyroid: not enlarged, symmetric, no " tenderness/mass/nodules  Back: Symmetric, no curvature, ROM normal,  Lungs: Clear to auscultation bilaterally, respirations unlabored  Breasts: No breast masses, tenderness, asymmetry, or nipple discharge.  Heart: Regular rate and rhythm, S1 and S2 normal, no murmur, rub, or gallop  Abdomen: Soft, non-tender, bowel sounds active all four quadrants,  no masses, no organomegaly  Extremities: Extremities normal, atraumatic, no cyanosis or edema  Skin: Skin color, texture, turgor normal, no rashes or lesions  Lymph nodes: Cervical, supraclavicular, and axillary nodes normal and   Neurologic: Normal          ASSESSMENT / PLAN:   1. Encounter for Medicare annual wellness exam  - REVIEW OF HEALTH MAINTENANCE PROTOCOL ORDERS  - PRIMARY CARE FOLLOW-UP SCHEDULING; Future    2. Visit for screening mammogram    3. Primary hypertension  - Lipid panel reflex to direct LDL Non-fasting; Future  - COMPREHENSIVE METABOLIC PANEL; Future  - CBC with Platelets; Future  - Lipid panel reflex to direct LDL Non-fasting  - COMPREHENSIVE METABOLIC PANEL  - CBC with Platelets    4. Hypovitaminosis D  - Vitamin D Deficiency; Future  - Vitamin D Deficiency    5. Vitamin B12 deficiency (non anemic)  - Vitamin B12; Future  - Vitamin B12    6. Need for vaccination against respiratory syncytial virus  Will get at pharmacy    7. Screening for diabetes mellitus  - HEMOGLOBIN A1C; Future  - HEMOGLOBIN A1C    8. Morbid obesity (H)  Discussed diet and physical activity.  Discussed staying active.      Patient has been advised of split billing requirements and indicates understanding: Yes      COUNSELING:  Reviewed preventive health counseling, as reflected in patient instructions        She reports that she has quit smoking. She has never used smokeless tobacco.      Appropriate preventive services were discussed with this patient, including applicable screening as appropriate for fall prevention, nutrition, physical activity, Tobacco-use cessation,  weight loss and cognition.  Checklist reviewing preventive services available has been given to the patient.    Reviewed patients plan of care and provided an AVS. The Basic Care Plan (routine screening as documented in Health Maintenance) for Kelsey meets the Care Plan requirement. This Care Plan has been established and reviewed with the Patient.          Millie De Paz MD  [facility]    Identified Health Risks:  I have reviewed Opioid Use Disorder and Substance Use Disorder risk factors and made any needed referrals.

## 2024-01-15 DIAGNOSIS — I10 HTN (HYPERTENSION): ICD-10-CM

## 2024-01-15 DIAGNOSIS — I10 HYPERTENSION, UNSPECIFIED TYPE: ICD-10-CM

## 2024-01-15 RX ORDER — AMLODIPINE BESYLATE 2.5 MG/1
TABLET ORAL
Qty: 90 TABLET | Refills: 2 | Status: SHIPPED | OUTPATIENT
Start: 2024-01-15 | End: 2024-10-03

## 2024-01-15 RX ORDER — HYDROCHLOROTHIAZIDE 25 MG/1
TABLET ORAL
Qty: 90 TABLET | Refills: 2 | Status: SHIPPED | OUTPATIENT
Start: 2024-01-15 | End: 2024-10-03

## 2024-01-15 NOTE — TELEPHONE ENCOUNTER
Requested Prescriptions   Pending Prescriptions Disp Refills    amLODIPine (NORVASC) 2.5 MG tablet [Pharmacy Med Name: AMLODIPINE BESYLATE 2.5MG TABS] 90 tablet 2     Sig: TAKE ONE TABLET BY MOUTH ONCE DAILY       Calcium Channel Blockers Protocol  Passed - 1/15/2024  2:28 PM        Passed - Blood pressure under 140/90 in past 12 months     BP Readings from Last 3 Encounters:   11/17/23 102/68   11/09/22 112/78   11/29/21 138/84                 Passed - Recent (12 mo) or future (30 days) visit within the authorizing provider's specialty     The patient must have completed an in-person or virtual visit within the past 12 months or has a future visit scheduled within the next 90 days with the authorizing provider s specialty.  Urgent care and e-visits do not quality as an office visit for this protocol.          Passed - Medication is active on med list        Passed - Patient is age 18 or older        Passed - No active pregnancy on record        Passed - Normal serum creatinine on file in past 12 months     Recent Labs   Lab Test 11/17/23  1552   CR 0.86       Ok to refill medication if creatinine is low          Passed - No positive pregnancy test in past 12 months          hydrochlorothiazide (HYDRODIURIL) 25 MG tablet [Pharmacy Med Name: HYDROCHLOROTHIAZIDE 25MG TABS] 90 tablet 2     Sig: TAKE ONE TABLET BY MOUTH ONCE DAILY       Diuretics (Including Combos) Protocol Passed - 1/15/2024  2:28 PM        Passed - Blood pressure under 140/90 in past 12 months     BP Readings from Last 3 Encounters:   11/17/23 102/68   11/09/22 112/78   11/29/21 138/84                 Passed - Recent (12 mo) or future (30 days) visit within the authorizing provider's specialty     The patient must have completed an in-person or virtual visit within the past 12 months or has a future visit scheduled within the next 90 days with the authorizing provider s specialty.  Urgent care and e-visits do not quality as an office visit for this  protocol.          Passed - Medication is active on med list        Passed - Patient is age 18 or older        Passed - No active pregancy on record        Passed - Normal serum creatinine on file in past 12 months     Recent Labs   Lab Test 11/17/23  1552   CR 0.86              Passed - Normal serum potassium on file in past 12 months     Recent Labs   Lab Test 11/17/23  1552   POTASSIUM 3.9                    Passed - Normal serum sodium on file in past 12 months     Recent Labs   Lab Test 11/17/23  1552                 Passed - No positive pregnancy test in past 12 months                 Manuel Serrano RN 01/15/24 2:53 PM

## 2024-02-09 ENCOUNTER — MYC MEDICAL ADVICE (OUTPATIENT)
Dept: FAMILY MEDICINE | Facility: CLINIC | Age: 69
End: 2024-02-09
Payer: COMMERCIAL

## 2024-02-09 DIAGNOSIS — R06.2 WHEEZING: ICD-10-CM

## 2024-02-09 DIAGNOSIS — R05.9 COUGH: Primary | ICD-10-CM

## 2024-02-09 RX ORDER — ALBUTEROL SULFATE 90 UG/1
2 AEROSOL, METERED RESPIRATORY (INHALATION) EVERY 6 HOURS PRN
Qty: 18 G | Refills: 1 | Status: SHIPPED | OUTPATIENT
Start: 2024-02-09 | End: 2024-07-30

## 2024-02-09 NOTE — TELEPHONE ENCOUNTER
Routing refill request to provider for review/approval because:  Medication is reported/historical      Requested Prescriptions   Pending Prescriptions Disp Refills    albuterol (PROAIR HFA/PROVENTIL HFA/VENTOLIN HFA) 108 (90 Base) MCG/ACT inhaler       Sig: Inhale 2 puffs into the lungs every 6 hours as needed       There is no refill protocol information for this order              Manuel Serrano RN 02/09/24 1:33 PM

## 2024-02-19 ENCOUNTER — E-VISIT (OUTPATIENT)
Dept: OBGYN | Facility: CLINIC | Age: 69
End: 2024-02-19
Payer: COMMERCIAL

## 2024-02-19 DIAGNOSIS — R05.9 COUGH, UNSPECIFIED TYPE: Primary | ICD-10-CM

## 2024-02-19 PROCEDURE — 99207 PR NON-BILLABLE SERV PER CHARTING: CPT | Performed by: FAMILY MEDICINE

## 2024-02-22 ENCOUNTER — NURSE TRIAGE (OUTPATIENT)
Dept: FAMILY MEDICINE | Facility: CLINIC | Age: 69
End: 2024-02-22
Payer: COMMERCIAL

## 2024-02-22 NOTE — TELEPHONE ENCOUNTER
Provider Response to 2nd Level Triage Request    I have reviewed the RN documentation. My recommendation is:  Agree with office visit tomorrow.  Sounds like symptoms have been stable or improving for the last week. To ER for worsening    Dr. Irasema Mejía, DO

## 2024-02-22 NOTE — TELEPHONE ENCOUNTER
Reason for Disposition   Wheezing is present    Protocols used: Cough-A-OH      Nurse Triage SBAR    Is this a 2nd Level Triage? YES, LICENSED PRACTITIONER REVIEW IS REQUIRED    Situation: Ongoing cough and intermittent wheezing for about a week     Background: Patient has Asthma and had viral illness a week or so ago that has lingered     Assessment: Patient reporting ongoing cough and wheezing over the last week   States she developed a viral illness with a fever last week and symptoms have improved except for the cough and wheezing    Cough is dry and intermittently productive  Sputum is thick and green/yellow in color - occasionally she has noted a pink tinge   Patient using cough drops, Coricidin, and Albuterol - reporting improvement     Patient states she gets dyspneic with exertion - this resolves with rest   Denies shortness of breath when resting  Patient is conversing appropriately and not needing to stop and catch her breath     Reporting an intermittent expiratory wheeze - no wheezing noted during call  States Albuterol helps with wheezing - states this is normal with her Asthma when she gets a viral illness    Denies current fever  Denies chest pain - does report some soreness to her ribcage from coughing   Denies pain with deep breathing     Protocol Recommended Disposition:   Go To Office Now    Recommendation: Appointment scheduled with Dr. De Paz 2/23/2024 at 1040   Reviewed red flag symptoms that would warrant ED evaluation  Patient verbalized understanding     Routed to provider    Does the patient meet one of the following criteria for ADS visit consideration? Marleny Serrano, Clinic RN  [facility]

## 2024-02-23 ENCOUNTER — OFFICE VISIT (OUTPATIENT)
Dept: FAMILY MEDICINE | Facility: CLINIC | Age: 69
End: 2024-02-23
Payer: COMMERCIAL

## 2024-02-23 VITALS
HEART RATE: 72 BPM | DIASTOLIC BLOOD PRESSURE: 80 MMHG | WEIGHT: 196.25 LBS | RESPIRATION RATE: 16 BRPM | HEIGHT: 62 IN | OXYGEN SATURATION: 97 % | TEMPERATURE: 97.8 F | BODY MASS INDEX: 36.12 KG/M2 | SYSTOLIC BLOOD PRESSURE: 124 MMHG

## 2024-02-23 DIAGNOSIS — E66.812 CLASS 2 SEVERE OBESITY DUE TO EXCESS CALORIES WITH SERIOUS COMORBIDITY AND BODY MASS INDEX (BMI) OF 35.0 TO 35.9 IN ADULT (H): ICD-10-CM

## 2024-02-23 DIAGNOSIS — E66.01 CLASS 2 SEVERE OBESITY DUE TO EXCESS CALORIES WITH SERIOUS COMORBIDITY AND BODY MASS INDEX (BMI) OF 35.0 TO 35.9 IN ADULT (H): ICD-10-CM

## 2024-02-23 DIAGNOSIS — J45.21 MILD INTERMITTENT ASTHMA WITH EXACERBATION: Primary | ICD-10-CM

## 2024-02-23 PROCEDURE — 99214 OFFICE O/P EST MOD 30 MIN: CPT | Performed by: FAMILY MEDICINE

## 2024-02-23 RX ORDER — PREDNISONE 20 MG/1
20 TABLET ORAL 2 TIMES DAILY
Qty: 10 TABLET | Refills: 0 | Status: SHIPPED | OUTPATIENT
Start: 2024-02-23 | End: 2024-04-22

## 2024-02-23 ASSESSMENT — ASTHMA QUESTIONNAIRES
QUESTION_5 LAST FOUR WEEKS HOW WOULD YOU RATE YOUR ASTHMA CONTROL: WELL CONTROLLED
ACT_TOTALSCORE: 22
QUESTION_2 LAST FOUR WEEKS HOW OFTEN HAVE YOU HAD SHORTNESS OF BREATH: NOT AT ALL
ACT_TOTALSCORE: 22
QUESTION_1 LAST FOUR WEEKS HOW MUCH OF THE TIME DID YOUR ASTHMA KEEP YOU FROM GETTING AS MUCH DONE AT WORK, SCHOOL OR AT HOME: NONE OF THE TIME
QUESTION_4 LAST FOUR WEEKS HOW OFTEN HAVE YOU USED YOUR RESCUE INHALER OR NEBULIZER MEDICATION (SUCH AS ALBUTEROL): ONCE A WEEK OR LESS
QUESTION_3 LAST FOUR WEEKS HOW OFTEN DID YOUR ASTHMA SYMPTOMS (WHEEZING, COUGHING, SHORTNESS OF BREATH, CHEST TIGHTNESS OR PAIN) WAKE YOU UP AT NIGHT OR EARLIER THAN USUAL IN THE MORNING: ONCE OR TWICE

## 2024-02-23 NOTE — PROGRESS NOTES
Assessment/Plan:    Kelsey Acevedo is a 68 year old female presenting for:    Mild intermittent asthma with exacerbation  Given that her fevers have completely resolved and she is actually feeling a bit better other than her breathing I do not think she needs antibiotics at this time.  Prednisone sent to the pharmacy and we discussed risk and benefits of the medication.  She will contact me if she begins to feel worse or begins to have fevers again.  - predniSONE (DELTASONE) 20 MG tablet  Dispense: 10 tablet; Refill: 0    Class 2 severe obesity due to excess calories with serious comorbidity and body mass index (BMI) of 35.0 to 35.9 in adult (H)  Patient is working on diet and physical activity.  Encouragement given.      There are no discontinued medications.        Chief Complaint:  Cough        Subjective:   Kelsey Acevedo is a very pleasant 68-year-old female who presents to the clinic today with concerns for wheezing and some mild shortness of breath.    Patient has a past medical history significant for mild intermittent asthma.  Typically she will use her albuterol inhaler maybe once or twice weekly and feels as though her asthma is very well-controlled.    She notes that about a week ago she began to get a sore throat.  This then progressed to some mild coughing and wheezing.  Patient notes that she had a fever for a few days but that is overall abated.  She did not do any self swabs.    Her granddaughter had a similar thing and has improved significantly.    There were a few days the patient actually felt fairly ill but she is feeling much better other than she continues to have wheezing and shortness of breath.  The albuterol does help for about an hour but then wears off.  She is using her albuterol 3-4 times daily.    She is coughing up some clear/whitish sputum that occasionally will have a few flecks of blood in it.    She is eating and drinking well.    12 point review of systems completed and  "negative except for what has been described above    History   Smoking Status    Former   Smokeless Tobacco    Never         Current Outpatient Medications:     albuterol (PROAIR HFA/PROVENTIL HFA/VENTOLIN HFA) 108 (90 Base) MCG/ACT inhaler, Inhale 2 puffs into the lungs every 6 hours as needed for wheezing, Disp: 18 g, Rfl: 1    amLODIPine (NORVASC) 2.5 MG tablet, TAKE ONE TABLET BY MOUTH ONCE DAILY, Disp: 90 tablet, Rfl: 2    carvedilol (COREG) 12.5 MG tablet, TAKE ONE TABLET BY MOUTH TWICE A DAY WITH MEALS, Disp: 180 tablet, Rfl: 0    cholecalciferol, vitamin D3, (VITAMIN D3 ORAL), [CHOLECALCIFEROL, VITAMIN D3, (VITAMIN D3 ORAL)] Take by mouth., Disp: , Rfl:     Cyanocobalamin (VITAMIN B-12 PO), , Disp: , Rfl:     hydrochlorothiazide (HYDRODIURIL) 25 MG tablet, TAKE ONE TABLET BY MOUTH ONCE DAILY, Disp: 90 tablet, Rfl: 2    hydrocortisone 2.5 % ointment, Apply topically 2 times daily, Disp: 30 g, Rfl: 1    potassium citrate (UROCIT-K) 10 MEQ (1080 MG) CR tablet, TAKE TWO TABLETS BY MOUTH TWICE A DAY, Disp: 360 tablet, Rfl: 3    predniSONE (DELTASONE) 20 MG tablet, Take 1 tablet (20 mg) by mouth 2 times daily, Disp: 10 tablet, Rfl: 0    rosuvastatin (CRESTOR) 10 MG tablet, Take 1 tablet by mouth daily, Disp: , Rfl:     triamcinolone (KENALOG) 0.1 % external ointment, , Disp: , Rfl:       Objective:  Vitals:    02/23/24 1055   BP: 124/80   Pulse: 72   Resp: 16   Temp: 97.8  F (36.6  C)   TempSrc: Tympanic   SpO2: 97%   Weight: 89 kg (196 lb 4 oz)   Height: 1.575 m (5' 2\")       Body mass index is 35.89 kg/m .    Vital signs reviewed and stable  General: No acute distress  Psych: Appropriate affect  HEENT: moist mucous membranes, pupils equal, round, reactive to light and accomodation, tympanic membranes are pearly grey bilaterally  Lymph: no cervical or supraclavicular lymphadenopathy  Cardiovascular: regular rate and rhythm with no murmur  Pulmonary: Expiratory wheezing throughout all lung fields with no crackles. "  Good air movement.  Abdomen: soft, non tender, non distended with normo-active bowel sounds  Extremities: warm and well perfused with no edema  Skin: warm and dry with no rash         This note has been dictated and transcribed using voice recognition software.   Any errors in transcription are unintentional and inherent to the software.  Answers submitted by the patient for this visit:  General Questionnaire (Submitted on 2/23/2024)  Chief Complaint: Chronic problems general questions HPI Form  How many servings of fruits and vegetables do you eat daily?: 2-3  On average, how many sweetened beverages do you drink each day (Examples: soda, juice, sweet tea, etc.  Do NOT count diet or artificially sweetened beverages)?: 1  How many days per week do you miss taking your medication?: 0  General Concern (Submitted on 2/23/2024)  Chief Complaint: Chronic problems general questions HPI Form  What is the reason for your visit today?: breathibg  When did your symptoms begin?: 3-7 days ago  What are your symptoms?: shortness of breath  How would you describe these symptoms?: Moderate  Are your symptoms:: Staying the same  Have you had these symptoms before?: Yes  Have you tried or received treatment for these symptoms before?: Yes  Did that treatment work? : Yes  Is there anything that makes you feel worse?: prednisone  Is there anything that makes you feel better?: sleep

## 2024-03-12 DIAGNOSIS — I10 HTN (HYPERTENSION): ICD-10-CM

## 2024-03-12 RX ORDER — CARVEDILOL 12.5 MG/1
TABLET ORAL
Qty: 180 TABLET | Refills: 1 | Status: SHIPPED | OUTPATIENT
Start: 2024-03-12 | End: 2024-09-12

## 2024-04-26 ENCOUNTER — OFFICE VISIT (OUTPATIENT)
Dept: FAMILY MEDICINE | Facility: CLINIC | Age: 69
End: 2024-04-26
Payer: COMMERCIAL

## 2024-04-26 VITALS
DIASTOLIC BLOOD PRESSURE: 62 MMHG | OXYGEN SATURATION: 97 % | BODY MASS INDEX: 34.96 KG/M2 | WEIGHT: 190 LBS | TEMPERATURE: 98.1 F | HEIGHT: 62 IN | RESPIRATION RATE: 12 BRPM | HEART RATE: 64 BPM | SYSTOLIC BLOOD PRESSURE: 100 MMHG

## 2024-04-26 DIAGNOSIS — R59.0 MESENTERIC LYMPHADENOPATHY: ICD-10-CM

## 2024-04-26 DIAGNOSIS — E87.6 HYPOKALEMIA: ICD-10-CM

## 2024-04-26 DIAGNOSIS — R10.84 ABDOMINAL PAIN, GENERALIZED: Primary | ICD-10-CM

## 2024-04-26 LAB
ANION GAP SERPL CALCULATED.3IONS-SCNC: 13 MMOL/L (ref 7–15)
BUN SERPL-MCNC: 9.7 MG/DL (ref 8–23)
CALCIUM SERPL-MCNC: 9.3 MG/DL (ref 8.8–10.2)
CHLORIDE SERPL-SCNC: 97 MMOL/L (ref 98–107)
CREAT SERPL-MCNC: 0.64 MG/DL (ref 0.51–0.95)
DEPRECATED HCO3 PLAS-SCNC: 30 MMOL/L (ref 22–29)
EGFRCR SERPLBLD CKD-EPI 2021: >90 ML/MIN/1.73M2
GLUCOSE SERPL-MCNC: 120 MG/DL (ref 70–99)
POTASSIUM SERPL-SCNC: 3.5 MMOL/L (ref 3.4–5.3)
SODIUM SERPL-SCNC: 140 MMOL/L (ref 135–145)

## 2024-04-26 PROCEDURE — 36415 COLL VENOUS BLD VENIPUNCTURE: CPT | Performed by: FAMILY MEDICINE

## 2024-04-26 PROCEDURE — 80048 BASIC METABOLIC PNL TOTAL CA: CPT | Performed by: FAMILY MEDICINE

## 2024-04-26 PROCEDURE — 99213 OFFICE O/P EST LOW 20 MIN: CPT | Performed by: FAMILY MEDICINE

## 2024-04-26 RX ORDER — METRONIDAZOLE 500 MG/1
TABLET ORAL
COMMUNITY
Start: 2024-04-18 | End: 2024-04-29

## 2024-04-29 NOTE — PROGRESS NOTES
Assessment/Plan:    Kelsey Acevedo is a 68 year old female presenting for:    Abdominal pain, generalized  This is resolved.  Unclear etiology.  Could be the bacterial vaginosis or potentially just a viral infection.  She has finished the metronidazole and does not feel as though she is having any more vaginal discharge.    Hypokalemia  Repeat BMP today shows normalization of potassium.  - Basic metabolic panel  (Ca, Cl, CO2, Creat, Gluc, K, Na, BUN)  - Basic metabolic panel  (Ca, Cl, CO2, Creat, Gluc, K, Na, BUN)    Mesenteric lymphadenopathy  Most likely reactive in nature.  Discussed with the patient that we could repeat a CT scan in 3 to 6 months if she would like and we can discuss this at her physical in the fall.  I placed a note to myself also to remind myself to discuss this with her.      Medications Discontinued During This Encounter   Medication Reason    predniSONE (DELTASONE) 20 MG tablet     spacer (OPTICHAMBER ALEXA) holding chamber     hydrocortisone 2.5 % ointment Therapy completed (No AVS)           Chief Complaint:  ER F/U        Subjective:   Kelsey Acevedo is a pleasant 68-year-old female who presents to the clinic today for follow-up of the emergency department encounter.  Patient was seen in the urgency room for generalized abdominal pain which started on 4/16.  This became more of a suprapubic pain.  She originally had an onset of a fever but that abated fairly quickly.  She was just doing a clear liquid diet which was somewhat helpful.    In the urgency room a wet prep showed clue cells and she was started on oral metronidazole.  She had a CT scan which showed diverticulosis but no diverticulitis.  There were multiple prominent and mildly enlarged mesenteric lymph nodes which appear to be nonspecific and potentially reactive lymph nodes.    Blood work was unremarkable other than potassium was a bit low.    She has been now doing well over the last few days.  Pain has pretty much resolved.  " She is been eating and drinking fairly normally.    She wanted to follow-up today with her potassium as well as the lymph nodes.    12 point review of systems completed and negative except for what has been described above    History   Smoking Status    Former   Smokeless Tobacco    Never         Current Outpatient Medications:     albuterol (PROAIR HFA/PROVENTIL HFA/VENTOLIN HFA) 108 (90 Base) MCG/ACT inhaler, Inhale 2 puffs into the lungs every 6 hours as needed for wheezing, Disp: 18 g, Rfl: 1    amLODIPine (NORVASC) 2.5 MG tablet, TAKE ONE TABLET BY MOUTH ONCE DAILY, Disp: 90 tablet, Rfl: 2    carvedilol (COREG) 12.5 MG tablet, TAKE ONE TABLET BY MOUTH TWICE A DAY WITH MEALS, Disp: 180 tablet, Rfl: 1    cholecalciferol, vitamin D3, (VITAMIN D3 ORAL), [CHOLECALCIFEROL, VITAMIN D3, (VITAMIN D3 ORAL)] Take by mouth., Disp: , Rfl:     Cyanocobalamin (VITAMIN B-12 PO), , Disp: , Rfl:     hydrochlorothiazide (HYDRODIURIL) 25 MG tablet, TAKE ONE TABLET BY MOUTH ONCE DAILY, Disp: 90 tablet, Rfl: 2    potassium citrate (UROCIT-K) 10 MEQ (1080 MG) CR tablet, TAKE TWO TABLETS BY MOUTH TWICE A DAY, Disp: 360 tablet, Rfl: 3    rosuvastatin (CRESTOR) 10 MG tablet, Take 1 tablet by mouth daily, Disp: , Rfl:     triamcinolone (KENALOG) 0.1 % external ointment, , Disp: , Rfl:     metroNIDAZOLE (FLAGYL) 500 MG tablet, , Disp: , Rfl:       Objective:  Vitals:    04/26/24 1357   BP: 100/62   Pulse: 64   Resp: 12   Temp: 98.1  F (36.7  C)   TempSrc: Tympanic   SpO2: 97%   Weight: 86.2 kg (190 lb)   Height: 1.575 m (5' 2\")       Body mass index is 34.75 kg/m .    Vital signs reviewed and stable  General: No acute distress  Psych: Appropriate affect  HEENT: moist mucous membranes, pupils equal, round, reactive to light and accomodation, tympanic membranes are pearly grey bilaterally  Lymph: no cervical or supraclavicular lymphadenopathy  Cardiovascular: regular rate and rhythm with no murmur  Pulmonary: clear to auscultation " bilaterally with no wheeze  Abdomen: soft, non tender, non distended with normo-active bowel sounds  Extremities: warm and well perfused with no edema  Skin: warm and dry with no rash         This note has been dictated and transcribed using voice recognition software.   Any errors in transcription are unintentional and inherent to the software.

## 2024-07-30 DIAGNOSIS — R06.2 WHEEZING: ICD-10-CM

## 2024-07-30 RX ORDER — ALBUTEROL SULFATE 90 UG/1
2 AEROSOL, METERED RESPIRATORY (INHALATION) EVERY 6 HOURS PRN
Qty: 18 G | Refills: 1 | Status: SHIPPED | OUTPATIENT
Start: 2024-07-30

## 2024-08-01 ENCOUNTER — E-VISIT (OUTPATIENT)
Dept: URGENT CARE | Facility: CLINIC | Age: 69
End: 2024-08-01
Payer: COMMERCIAL

## 2024-08-01 ENCOUNTER — TELEPHONE (OUTPATIENT)
Dept: FAMILY MEDICINE | Facility: CLINIC | Age: 69
End: 2024-08-01

## 2024-08-01 DIAGNOSIS — R06.2 WHEEZING: Primary | ICD-10-CM

## 2024-08-01 PROCEDURE — 99207 PR NON-BILLABLE SERV PER CHARTING: CPT | Performed by: FAMILY MEDICINE

## 2024-08-01 NOTE — PATIENT INSTRUCTIONS
Dear Kelsey Acevedo,    We are sorry you are not feeling well. Based on the responses you provided, it is recommended that you be seen in-person in urgent care so we can better evaluate your symptoms. Please click here to find the nearest urgent care location to you.   You will not be charged for this Visit. Thank you for trusting us with your care.    Tomeka Rosen MD

## 2024-08-01 NOTE — TELEPHONE ENCOUNTER
Patient calling with c/o cough and runny nose for the last week. Patient had fever for the last 2 days, but denies fever today.     No appointments available with PCP. Patient advised to go to Urgent Care due to recent COVID exposure. Patient has no home COVID tests. Patient voiced understanding.    Rudolph MAXWELL RN  [facility]

## 2024-08-02 ENCOUNTER — VIRTUAL VISIT (OUTPATIENT)
Dept: FAMILY MEDICINE | Facility: CLINIC | Age: 69
End: 2024-08-02
Payer: COMMERCIAL

## 2024-08-02 DIAGNOSIS — U07.1 INFECTION DUE TO 2019 NOVEL CORONAVIRUS: Primary | ICD-10-CM

## 2024-08-02 PROCEDURE — 99441 PR PHYSICIAN TELEPHONE EVALUATION 5-10 MIN: CPT | Mod: 93 | Performed by: STUDENT IN AN ORGANIZED HEALTH CARE EDUCATION/TRAINING PROGRAM

## 2024-08-02 NOTE — PATIENT INSTRUCTIONS
Coronavirus (COVID-19): Care Instructions  What is COVID-19?  COVID-19 is a disease caused by a type of coronavirus. This illness was first found in 2019 and has since spread worldwide (pandemic). Symptoms can range from mild, such as fever and body aches, to severe, including trouble breathing. COVID-19 can be deadly.  Coronaviruses are a large group of viruses. Some types cause the common cold. Others cause more serious illnesses like [place] respiratory syndrome (MERS) and severe acute respiratory syndrome (SARS).  Follow-up care is a key part of your treatment and safety. Be sure to make and go to all appointments, and call your doctor if you are having problems. It's also a good idea to know your test results and keep a list of the medicines you take.  How can you self-isolate when you have COVID-19?  If you have COVID-19, there are things you can do to help avoid spreading the virus to others.  Stay home, and avoid contact with other people.  Limit contact with people in your home. If possible, stay in a separate bedroom and use a separate bathroom.  Wear a high-quality mask when you are around other people.  Improve airflow. If you have to spend time indoors with others, open windows and doors. Or you can use a fan to blow air away from people and out a window.  Avoid contact with pets and other animals.  Cover your mouth and nose with a tissue when you cough or sneeze. Then throw it in the trash right away.  Wash your hands often, especially after you cough or sneeze. Use soap and water, and scrub for at least 20 seconds. If soap and water aren't available, use an alcohol-based hand .  Don't share personal household items. These include bedding, towels, cups and glasses, and eating utensils.  Wash laundry in the warmest water allowed for the fabric type, and dry it completely. It's okay to wash other people's laundry with yours.  Clean and disinfect your home. Use household  and  disinfectant wipes or sprays.  Go to the CDC website at cdc.gov if you have questions.  When can you end self-isolation for COVID-19?  If you know or think that you have the virus, you will need to self-isolate. When you can be around other people you live with and leave home depends on whether you have symptoms. Important: Day 0 is the day your symptoms started or the day you tested positive. Day 1 is the day after your symptoms first started or your test was positive.  If you tested positive but had no symptoms, it's safe to end isolation at the end of Day 5. But if you start to have symptoms, follow the recommendations below, and count your first day of symptoms as Day 0.  If you have symptoms, when you can end isolation depends on how sick you were and your overall health. No matter what, you need to wait until your symptoms are getting better and you haven't had a fever for 24 hours while not taking medicines to lower the fever. Here's how long to isolate, based on your symptoms:  If you were only a little sick: (This means you might have felt really bad but had no shortness of breath and never needed to be in the hospital.) You can end isolation at the end of Day 5.  If you were more sick: (You had some shortness of breath or some trouble breathing but never needed to be in the hospital.) You can end isolation at the end of Day 10.  If you were very sick and needed to be in the hospital, or if you have a weakened immune system: You can end isolation at the end of Day 10 or later. Talk to your doctor to find out when it's safe to end isolation. You may need a viral test.  After you end isolation, if your symptoms come back or get worse: Restart your isolation at Day 0. Do this even if it happens after you took medicine for COVID.  Avoid travel and stay away from people at high risk for serious disease for at least 10 days.  Those who can't wear a mask because they are under 2 years old or have certain  "disabilities should isolate for at least 10 full days.  Call your doctor or seek care if you have questions about your symptoms or when to end isolation.  Go to the CDC website at cdc.gov if you have questions.  Where can you learn more?  Go to https://www.[employer].net/patiented  Enter C007 in the search box to learn more about \"Coronavirus (COVID-19): Care Instructions.\"  Current as of: May 28, 2024  Content Version: 14.1 2006-2024 [employer].   Care instructions adapted under license by your healthcare professional. If you have questions about a medical condition or this instruction, always ask your healthcare professional. [employer] disclaims any warranty or liability for your use of this information.    "

## 2024-08-02 NOTE — PROGRESS NOTES
Kelsey is a 69 year old who is being evaluated via a billable telephone visit.    What phone number would you like to be contacted at? [phone]  How would you like to obtain your AVS? Josué  Originating Location (pt. Location): Home    Distant Location (provider location):  On-site    Assessment & Plan     Infection due to 2019 novel coronavirus  > patient is over the age of 65, had been given paxlovid in the past when she had covid before and did not have any side effects   - prescription sent for nirmatrelvir and ritonavir (PAXLOVID) 300 mg/100 mg therapy pack; Take 3 tablets by mouth 2 times daily for 5 days (Take 2 Nirmatrelvir tablets and 1 Ritonavir tablet twice daily for 5 days)  - patient aware to not take Crestor/rosuvastatin when taking paxlovid     Subjective   Kelsey is a 69 year old, presenting for the following health issues:  Covid Concern (Would like to discuss Paxlovid )        8/2/2024     2:31 PM   Additional Questions   Roomed by Isabela DURAN LPN   Accompanied by self         8/2/2024     2:31 PM   Patient Reported Additional Medications   Patient reports taking the following new medications no new meds     History of Present Illness       Reason for visit:  Paxlovid          COVID-19 Symptom Review  How many days ago did these symptoms start? Tested positive yesterday 8/1/2024, symptom onset on Monday     Are any of the following symptoms significant for you?  New or worsening difficulty breathing? Yes  Please describe what kind of difficulty you are having breathing:Moderate dyspnea (short of breath with ADLs, shortness of breath that limits the ability to walk up one flight of stairs without needing to rest)  Worsening cough? Yes, I am coughing up mucus - yellowish in color  Fever or chills? Yes, the highest temperature was 101.7 orally at symptom onset, no longer has a fever for 24 hours  Headache: YES  Sore throat: YES  Chest pain: No  Diarrhea: YES  Body aches? YES    What treatments has  patient tried? Extra strength Acetaminophen and imodium for the diarrhea, and coricidin   Does patient live in a nursing home, group home, or shelter? No  Does patient have a way to get food/medications during quarantined? Yes, I have a friend or family member who can help me.        ROS:   As above         Objective    Vitals - Patient Reported  Pain Score: No Pain (1)      Vitals:  No vitals were obtained today due to virtual visit.    Physical Exam   General: Alert and no distress //Respiratory: No audible wheeze, cough, or shortness of breath // Psychiatric:  Appropriate affect, tone, and pace of words            Phone call duration: 5 minutes  Signed Electronically by: SHARI HARMAN MD

## 2024-08-13 ENCOUNTER — MYC MEDICAL ADVICE (OUTPATIENT)
Dept: FAMILY MEDICINE | Facility: CLINIC | Age: 69
End: 2024-08-13
Payer: COMMERCIAL

## 2024-08-13 DIAGNOSIS — Z20.822 EXPOSURE TO 2019 NOVEL CORONAVIRUS: Primary | ICD-10-CM

## 2024-08-15 RX ORDER — COVID-19 ANTIGEN TEST
1 KIT MISCELLANEOUS ONCE
Qty: 1 KIT | Refills: 0 | Status: SHIPPED | OUTPATIENT
Start: 2024-08-15 | End: 2024-08-15

## 2024-08-15 NOTE — TELEPHONE ENCOUNTER
Dr. Hartman,    Please see [employer] message below and advise. Last VV 08/02 for COVID.    Thank you  Rudolph MAXWELL RN  [facility]

## 2024-08-20 ENCOUNTER — PATIENT OUTREACH (OUTPATIENT)
Dept: CARE COORDINATION | Facility: CLINIC | Age: 69
End: 2024-08-20
Payer: COMMERCIAL

## 2024-09-12 DIAGNOSIS — I10 HTN (HYPERTENSION): ICD-10-CM

## 2024-09-12 RX ORDER — POTASSIUM CITRATE 10 MEQ/1
TABLET, EXTENDED RELEASE ORAL
Qty: 360 TABLET | Refills: 3 | Status: SHIPPED | OUTPATIENT
Start: 2024-09-12

## 2024-09-12 RX ORDER — CARVEDILOL 12.5 MG/1
TABLET ORAL
Qty: 180 TABLET | Refills: 1 | Status: SHIPPED | OUTPATIENT
Start: 2024-09-12

## 2024-09-13 DIAGNOSIS — I10 HTN (HYPERTENSION): ICD-10-CM

## 2024-09-13 RX ORDER — CARVEDILOL 12.5 MG/1
TABLET ORAL
Qty: 180 TABLET | Refills: 1 | OUTPATIENT
Start: 2024-09-13

## 2024-09-14 DIAGNOSIS — I10 HTN (HYPERTENSION): ICD-10-CM

## 2024-09-16 RX ORDER — CARVEDILOL 12.5 MG/1
TABLET ORAL
Qty: 180 TABLET | Refills: 1 | OUTPATIENT
Start: 2024-09-16

## 2024-09-17 ENCOUNTER — PATIENT OUTREACH (OUTPATIENT)
Dept: CARE COORDINATION | Facility: CLINIC | Age: 69
End: 2024-09-17
Payer: COMMERCIAL

## 2024-09-27 ENCOUNTER — TRANSFERRED RECORDS (OUTPATIENT)
Dept: HEALTH INFORMATION MANAGEMENT | Facility: CLINIC | Age: 69
End: 2024-09-27
Payer: COMMERCIAL

## 2024-10-03 DIAGNOSIS — I10 HYPERTENSION, UNSPECIFIED TYPE: ICD-10-CM

## 2024-10-03 DIAGNOSIS — I10 HTN (HYPERTENSION): ICD-10-CM

## 2024-10-03 RX ORDER — AMLODIPINE BESYLATE 2.5 MG/1
TABLET ORAL
Qty: 90 TABLET | Refills: 0 | Status: SHIPPED | OUTPATIENT
Start: 2024-10-03

## 2024-10-03 RX ORDER — HYDROCHLOROTHIAZIDE 25 MG/1
TABLET ORAL
Qty: 90 TABLET | Refills: 0 | Status: SHIPPED | OUTPATIENT
Start: 2024-10-03

## 2024-10-04 DIAGNOSIS — I10 HYPERTENSION, UNSPECIFIED TYPE: ICD-10-CM

## 2024-10-04 RX ORDER — AMLODIPINE BESYLATE 2.5 MG/1
TABLET ORAL
Qty: 90 TABLET | Refills: 2 | OUTPATIENT
Start: 2024-10-04

## 2024-10-05 DIAGNOSIS — I10 HYPERTENSION, UNSPECIFIED TYPE: ICD-10-CM

## 2024-10-07 RX ORDER — AMLODIPINE BESYLATE 2.5 MG/1
TABLET ORAL
Qty: 90 TABLET | Refills: 2 | OUTPATIENT
Start: 2024-10-07

## 2024-12-06 SDOH — HEALTH STABILITY: PHYSICAL HEALTH: ON AVERAGE, HOW MANY MINUTES DO YOU ENGAGE IN EXERCISE AT THIS LEVEL?: 20 MIN

## 2024-12-06 SDOH — HEALTH STABILITY: PHYSICAL HEALTH: ON AVERAGE, HOW MANY DAYS PER WEEK DO YOU ENGAGE IN MODERATE TO STRENUOUS EXERCISE (LIKE A BRISK WALK)?: 1 DAY

## 2024-12-06 ASSESSMENT — ASTHMA QUESTIONNAIRES
ACT_TOTALSCORE: 18
ACT_TOTALSCORE: 18
QUESTION_3 LAST FOUR WEEKS HOW OFTEN DID YOUR ASTHMA SYMPTOMS (WHEEZING, COUGHING, SHORTNESS OF BREATH, CHEST TIGHTNESS OR PAIN) WAKE YOU UP AT NIGHT OR EARLIER THAN USUAL IN THE MORNING: NOT AT ALL
QUESTION_2 LAST FOUR WEEKS HOW OFTEN HAVE YOU HAD SHORTNESS OF BREATH: ONCE OR TWICE A WEEK
QUESTION_5 LAST FOUR WEEKS HOW WOULD YOU RATE YOUR ASTHMA CONTROL: SOMEWHAT CONTROLLED
QUESTION_1 LAST FOUR WEEKS HOW MUCH OF THE TIME DID YOUR ASTHMA KEEP YOU FROM GETTING AS MUCH DONE AT WORK, SCHOOL OR AT HOME: A LITTLE OF THE TIME
QUESTION_4 LAST FOUR WEEKS HOW OFTEN HAVE YOU USED YOUR RESCUE INHALER OR NEBULIZER MEDICATION (SUCH AS ALBUTEROL): ONE OR TWO TIMES PER DAY

## 2024-12-06 ASSESSMENT — SOCIAL DETERMINANTS OF HEALTH (SDOH): HOW OFTEN DO YOU GET TOGETHER WITH FRIENDS OR RELATIVES?: ONCE A WEEK

## 2024-12-09 ENCOUNTER — OFFICE VISIT (OUTPATIENT)
Dept: FAMILY MEDICINE | Facility: CLINIC | Age: 69
End: 2024-12-09
Attending: FAMILY MEDICINE
Payer: COMMERCIAL

## 2024-12-09 VITALS
OXYGEN SATURATION: 96 % | SYSTOLIC BLOOD PRESSURE: 104 MMHG | TEMPERATURE: 97.9 F | HEIGHT: 62 IN | BODY MASS INDEX: 35.59 KG/M2 | WEIGHT: 193.38 LBS | HEART RATE: 69 BPM | RESPIRATION RATE: 12 BRPM | DIASTOLIC BLOOD PRESSURE: 78 MMHG

## 2024-12-09 DIAGNOSIS — L30.9 ECZEMA, UNSPECIFIED TYPE: ICD-10-CM

## 2024-12-09 DIAGNOSIS — K13.79 ORAL MUCOCELE: ICD-10-CM

## 2024-12-09 DIAGNOSIS — E78.5 HYPERLIPIDEMIA LDL GOAL <100: ICD-10-CM

## 2024-12-09 DIAGNOSIS — I10 HYPERTENSION, UNSPECIFIED TYPE: ICD-10-CM

## 2024-12-09 DIAGNOSIS — Z00.00 ENCOUNTER FOR MEDICARE ANNUAL WELLNESS EXAM: Primary | ICD-10-CM

## 2024-12-09 DIAGNOSIS — Z23 ENCOUNTER FOR IMMUNIZATION: ICD-10-CM

## 2024-12-09 DIAGNOSIS — R06.2 WHEEZING: ICD-10-CM

## 2024-12-09 DIAGNOSIS — Z13.1 SCREENING FOR DIABETES MELLITUS: ICD-10-CM

## 2024-12-09 DIAGNOSIS — J45.21 MILD INTERMITTENT ASTHMA WITH EXACERBATION: ICD-10-CM

## 2024-12-09 LAB
ANION GAP SERPL CALCULATED.3IONS-SCNC: 14 MMOL/L (ref 7–15)
BUN SERPL-MCNC: 9.6 MG/DL (ref 8–23)
CALCIUM SERPL-MCNC: 9.5 MG/DL (ref 8.8–10.4)
CHLORIDE SERPL-SCNC: 99 MMOL/L (ref 98–107)
CHOLEST SERPL-MCNC: 123 MG/DL
CREAT SERPL-MCNC: 0.6 MG/DL (ref 0.51–0.95)
EGFRCR SERPLBLD CKD-EPI 2021: >90 ML/MIN/1.73M2
EST. AVERAGE GLUCOSE BLD GHB EST-MCNC: 114 MG/DL
FASTING STATUS PATIENT QL REPORTED: YES
FASTING STATUS PATIENT QL REPORTED: YES
GLUCOSE SERPL-MCNC: 100 MG/DL (ref 70–99)
HBA1C MFR BLD: 5.6 % (ref 0–5.6)
HCO3 SERPL-SCNC: 29 MMOL/L (ref 22–29)
HDLC SERPL-MCNC: 61 MG/DL
LDLC SERPL CALC-MCNC: 48 MG/DL
NONHDLC SERPL-MCNC: 62 MG/DL
POTASSIUM SERPL-SCNC: 3.2 MMOL/L (ref 3.4–5.3)
SODIUM SERPL-SCNC: 142 MMOL/L (ref 135–145)
TRIGL SERPL-MCNC: 69 MG/DL

## 2024-12-09 PROCEDURE — 83036 HEMOGLOBIN GLYCOSYLATED A1C: CPT | Performed by: FAMILY MEDICINE

## 2024-12-09 PROCEDURE — G0008 ADMIN INFLUENZA VIRUS VAC: HCPCS | Performed by: FAMILY MEDICINE

## 2024-12-09 PROCEDURE — 80061 LIPID PANEL: CPT | Performed by: FAMILY MEDICINE

## 2024-12-09 PROCEDURE — 90662 IIV NO PRSV INCREASED AG IM: CPT | Performed by: FAMILY MEDICINE

## 2024-12-09 PROCEDURE — 36415 COLL VENOUS BLD VENIPUNCTURE: CPT | Performed by: FAMILY MEDICINE

## 2024-12-09 PROCEDURE — G0439 PPPS, SUBSEQ VISIT: HCPCS | Performed by: FAMILY MEDICINE

## 2024-12-09 PROCEDURE — 80048 BASIC METABOLIC PNL TOTAL CA: CPT | Performed by: FAMILY MEDICINE

## 2024-12-09 PROCEDURE — 99214 OFFICE O/P EST MOD 30 MIN: CPT | Mod: 25 | Performed by: FAMILY MEDICINE

## 2024-12-09 RX ORDER — FLUTICASONE PROPIONATE 110 UG/1
1 AEROSOL, METERED RESPIRATORY (INHALATION) 2 TIMES DAILY
Qty: 12 G | Refills: 3 | Status: SHIPPED | OUTPATIENT
Start: 2024-12-09

## 2024-12-09 RX ORDER — TRIAMCINOLONE ACETONIDE 1 MG/G
OINTMENT TOPICAL 2 TIMES DAILY
Qty: 30 G | Refills: 5 | Status: SHIPPED | OUTPATIENT
Start: 2024-12-09

## 2024-12-09 RX ORDER — AMLODIPINE BESYLATE 2.5 MG/1
2.5 TABLET ORAL DAILY
Qty: 90 TABLET | Refills: 3 | Status: SHIPPED | OUTPATIENT
Start: 2024-12-09

## 2024-12-09 RX ORDER — HYDROCHLOROTHIAZIDE 25 MG/1
25 TABLET ORAL DAILY
Qty: 90 TABLET | Refills: 3 | Status: SHIPPED | OUTPATIENT
Start: 2024-12-09

## 2024-12-09 RX ORDER — POTASSIUM CITRATE 10 MEQ/1
20 TABLET, EXTENDED RELEASE ORAL 2 TIMES DAILY
Qty: 360 TABLET | Refills: 3 | Status: SHIPPED | OUTPATIENT
Start: 2024-12-09

## 2024-12-09 RX ORDER — CARVEDILOL 12.5 MG/1
12.5 TABLET ORAL 2 TIMES DAILY WITH MEALS
Qty: 180 TABLET | Refills: 3 | Status: SHIPPED | OUTPATIENT
Start: 2024-12-09

## 2024-12-09 NOTE — PROGRESS NOTES
Preventive Care Visit  [facility] IZA De Paz MD, Family Medicine  Dec 9, 2024      Assessment & Plan     Encounter for Medicare annual wellness exam  - PRIMARY CARE FOLLOW-UP SCHEDULING  - INFLUENZA HIGH DOSE, TRIVALENT, PF (FLUZONE)  - REVIEW OF HEALTH MAINTENANCE PROTOCOL ORDERS  - PRIMARY CARE FOLLOW-UP SCHEDULING; Future    Hypertension, unspecified type  Refill -patient's blood pressure is at goal today (and even slightly low).  She will stop taking her Norvasc for a few weeks and follow-up with me regarding her blood pressures.  - carvedilol (COREG) 12.5 MG tablet; Take 1 tablet (12.5 mg) by mouth 2 times daily (with meals).  - hydrochlorothiazide (HYDRODIURIL) 25 MG tablet; Take 1 tablet (25 mg) by mouth daily.  - amLODIPine (NORVASC) 2.5 MG tablet; Take 1 tablet (2.5 mg) by mouth daily.  - potassium citrate (UROCIT-K) 10 MEQ (1080 MG) CR tablet; Take 2 tablets (20 mEq) by mouth 2 times daily.  - Basic metabolic panel  (Ca, Cl, CO2, Creat, Gluc, K, Na, BUN); Future  - Lipid panel reflex to direct LDL Fasting; Future  - Basic metabolic panel  (Ca, Cl, CO2, Creat, Gluc, K, Na, BUN)  - Lipid panel reflex to direct LDL Fasting    Screening for diabetes mellitus  - Hemoglobin A1c; Future  - Hemoglobin A1c    Hyperlipidemia LDL goal <100  - Lipid panel reflex to direct LDL Fasting; Future  - Lipid panel reflex to direct LDL Fasting    Eczema, unspecified type  Refill triamcinolone  - triamcinolone (KENALOG) 0.1 % external ointment; Apply topically 2 times daily.    Mild intermittent asthma with exacerbation  Flovent sent to the pharmacy.  Discussed risks and benefits of the medication.  Discussed that she still can use her albuterol but would be better off and healthier for her lungs to use a daily preventative medication  - fluticasone (FLOVENT HFA) 110 MCG/ACT inhaler; Inhale 1 puff into the lungs 2 times daily.    Wheezing    Encounter for immunization  - INFLUENZA HIGH DOSE,  "TRIVALENT, PF (FLUZONE)    Oral mucocele  Patient will consider if she would like a referral and will follow-up with me via [email or web address] message.    Patient has been advised of split billing requirements and indicates understanding: Yes        BMI  Estimated body mass index is 35.37 kg/m  as calculated from the following:    Height as of this encounter: 1.575 m (5' 2\").    Weight as of this encounter: 87.7 kg (193 lb 6 oz).   Weight management plan: Discussed healthy diet and exercise guidelines    Counseling  Appropriate preventive services were addressed with this patient via screening, questionnaire, or discussion as appropriate for fall prevention, nutrition, physical activity, Tobacco-use cessation, social engagement, weight loss and cognition.  Checklist reviewing preventive services available has been given to the patient.  Reviewed patient's diet, addressing concerns and/or questions.   She is at risk for lack of exercise and has been provided with information to increase physical activity for the benefit of her well-being.           Timmy Cole is a 69 year old, presenting for the following:  Medicare Visit (AWV-Px/Fasting for labs today/Strange \"lump\" inside her mouth on the L lower side- has been there for a few years now- isn't painful but is leaking \"something\"- comes and goes)      ALFREDO Cole is a very pleasant 69-year-old female who presents to the clinic today for a annual wellness visit.    She is overall doing well.  She has no specific questions or concerns other than she has a small cyst on the inside of her left lip that seems to grow when drinking sometimes will leak.  She notes that this drains may be about 3 times per month.  It is not painful for her but it is bothersome.  She states that her dentist noticed it and recommended that she speak with her primary provider.    Otherwise, she has a history of asthma.  ACT today is 18.  She states that she uses her albuterol a few times every day to " help with her coughing.  She also states that she feels wheezy occasionally.  She is unsure if she ever feels specifically short of breath.  She has been on daily controller inhalers before but has never used these for long-term.        Health Care Directive  Patient does not have a Health Care Directive: Discussed advance care planning with patient; however, patient declined at this time.      12/6/2024   General Health   How would you rate your overall physical health? Good   Feel stress (tense, anxious, or unable to sleep) Not at all            12/6/2024   Nutrition   Diet: I don't know            12/6/2024   Exercise   Days per week of moderate/strenous exercise 1 day   Average minutes spent exercising at this level 20 min      (!) EXERCISE CONCERN      12/6/2024   Social Factors   Frequency of gathering with friends or relatives Once a week   Worry food won't last until get money to buy more No   Food not last or not have enough money for food? No   Do you have housing? (Housing is defined as stable permanent housing and does not include staying ouside in a car, in a tent, in an abandoned building, in an overnight shelter, or couch-surfing.) Yes   Are you worried about losing your housing? No   Lack of transportation? No   Unable to get utilities (heat,electricity)? No            12/6/2024   Fall Risk   Fallen 2 or more times in the past year? No     No    Trouble with walking or balance? Yes     Yes        Patient-reported    Multiple values from one day are sorted in reverse-chronological order           12/6/2024   Activities of Daily Living- Home Safety   Needs help with the following daily activites None of the above   Safety concerns in the home None of the above            12/6/2024   Dental   Dentist two times every year? Yes            12/6/2024   Hearing Screening   Hearing concerns? None of the above            12/6/2024   Driving Risk Screening   Patient/family members have concerns about driving No             12/6/2024   General Alertness/Fatigue Screening   Have you been more tired than usual lately? No            12/6/2024   Urinary Incontinence Screening   Bothered by leaking urine in past 6 months No            12/6/2024   TB Screening   Were you born outside of the [place]? No            Today's PHQ-2 Score:       12/9/2024     2:13 PM   PHQ-2 ( 1999 [employer])   Q1: Little interest or pleasure in doing things 0    Q2: Feeling down, depressed or hopeless 0    PHQ-2 Score 0    Q1: Little interest or pleasure in doing things Not at all   Q2: Feeling down, depressed or hopeless Not at all   PHQ-2 Score 0       Patient-reported           12/6/2024   Substance Use   Alcohol more than 3/day or more than 7/wk No   Do you have a current opioid prescription? No   How severe/bad is pain from 1 to 10? 0/10 (No Pain)   Do you use any other substances recreationally? No        Social History     Tobacco Use    Smoking status: Former    Smokeless tobacco: Never   Vaping Use    Vaping status: Never Used          Mammogram Screening - Mammogram every 1-2 years updated in Health Maintenance based on mutual decision making    ASCVD Risk   The ASCVD Risk score (Nathen CONTRERAS, et al., 2019) failed to calculate for the following reasons:    The valid total cholesterol range is 130 to 320 mg/dL            Reviewed and updated as needed this visit by Provider                    Past Medical History:   Diagnosis Date    Bladder stone     Hypertension      Past Surgical History:   Procedure Laterality Date    BLADDER STONE REMOVAL      FOOT SURGERY      HYSTERECTOMY      OTHER SURGICAL HISTORY      bladder prolapse    OTHER SURGICAL HISTORY      eyelid surgery     Current providers sharing in care for this patient include:  Patient Care Team:  Millie De Paz MD as PCP - General (Family Practice)  Millie De Paz MD as Assigned PCP    The following health maintenance items are reviewed in Epic and correct as of  "today:  Health Maintenance   Topic Date Due    ASTHMA ACTION PLAN  Never done    LUNG CANCER SCREENING  10/14/2020    INFLUENZA VACCINE (1) 09/01/2024    COVID-19 Vaccine (6 - 2024-25 season) 09/01/2024    LIPID  11/17/2024    ANNUAL REVIEW OF HM ORDERS  11/17/2024    BMP  04/26/2025    ASTHMA CONTROL TEST  06/09/2025    MAMMO SCREENING  09/27/2025    MEDICARE ANNUAL WELLNESS VISIT  12/09/2025    FALL RISK ASSESSMENT  12/09/2025    GLUCOSE  04/26/2027    DTAP/TDAP/TD IMMUNIZATION (3 - Td or Tdap) 10/06/2027    COLORECTAL CANCER SCREENING  09/06/2028    ADVANCE CARE PLANNING  11/17/2028    DEXA  12/21/2036    HEPATITIS C SCREENING  Completed    PHQ-2 (once per calendar year)  Completed    Pneumococcal Vaccine: 65+ Years  Completed    ZOSTER IMMUNIZATION  Completed    RSV VACCINE  Completed    HPV IMMUNIZATION  Aged Out    MENINGITIS IMMUNIZATION  Aged Out    RSV MONOCLONAL ANTIBODY  Aged Out         Review of Systems  Constitutional, HEENT, cardiovascular, pulmonary, GI, , musculoskeletal, neuro, skin, endocrine and psych systems are negative, except as otherwise noted.     Objective    Exam  /78   Pulse 69   Temp 97.9  F (36.6  C) (Tympanic)   Resp 12   Ht 1.575 m (5' 2\")   Wt 87.7 kg (193 lb 6 oz)   SpO2 96%   BMI 35.37 kg/m     Estimated body mass index is 35.37 kg/m  as calculated from the following:    Height as of this encounter: 1.575 m (5' 2\").    Weight as of this encounter: 87.7 kg (193 lb 6 oz).    Physical Exam    Physical Exam:  General Appearance: Alert, cooperative, no distress, appears stated age   Head: Normocephalic, without obvious abnormality, atraumatic  Eyes: PERRL, conjunctiva/corneas clear, EOM's intact   Ears: Normal TM's and external ear canals, both ears  Nose:Nares normal, septum midline,mucosa normal, no drainage    Throat:Lips, mucosa, and tongue normal; teeth and gums normal  Neck: Supple, symmetrical, trachea midline, no adenopathy;  thyroid: not enlarged, symmetric, no " tenderness/mass/nodules  Back: Symmetric, no curvature, ROM normal,  Lungs: Clear to auscultation bilaterally, respirations unlabored  Breasts: No breast masses, tenderness, asymmetry, or nipple discharge.  Heart: Regular rate and rhythm, S1 and S2 normal, no murmur, rub, or gallop  Abdomen: Soft, non-tender, bowel sounds active all four quadrants,  no masses, no organomegaly  Extremities: Extremities normal, atraumatic, no cyanosis or edema  Skin: Skin color, texture, turgor normal, no rashes or lesions  Lymph nodes: Cervical, supraclavicular, and axillary nodes normal and   Neurologic: Normal          12/9/2024   Mini Cog   Clock Draw Score 2 Normal   3 Item Recall 3 objects recalled   Mini Cog Total Score 5                 Signed Electronically by: Millie De Paz MD

## 2024-12-09 NOTE — PATIENT INSTRUCTIONS
Stop taking the low dose amlodipine (norvasc) and let me know what your BP looks like in 3-4 weeks    Patient Education   Preventive Care Advice   This is general advice given by our system to help you stay healthy. However, your care team may have specific advice just for you. Please talk to your care team about your preventive care needs.  Nutrition  Eat 5 or more servings of fruits and vegetables each day.  Try wheat bread, brown rice and whole grain pasta (instead of white bread, rice, and pasta).  Get enough calcium and vitamin D. Check the label on foods and aim for 100% of the RDA (recommended daily allowance).  Lifestyle  Exercise at least 150 minutes each week  (30 minutes a day, 5 days a week).  Do muscle strengthening activities 2 days a week. These help control your weight and prevent disease.  No smoking.  Wear sunscreen to prevent skin cancer.  Have a dental exam and cleaning every 6 months.  Yearly exams  See your health care team every year to talk about:  Any changes in your health.  Any medicines your care team has prescribed.  Preventive care, family planning, and ways to prevent chronic diseases.  Shots (vaccines)   HPV shots (up to age 26), if you've never had them before.  Hepatitis B shots (up to age 59), if you've never had them before.  COVID-19 shot: Get this shot when it's due.  Flu shot: Get a flu shot every year.  Tetanus shot: Get a tetanus shot every 10 years.  Pneumococcal, hepatitis A, and RSV shots: Ask your care team if you need these based on your risk.  Shingles shot (for age 50 and up)  General health tests  Diabetes screening:  Starting at age 35, Get screened for diabetes at least every 3 years.  If you are younger than age 35, ask your care team if you should be screened for diabetes.  Cholesterol test: At age 39, start having a cholesterol test every 5 years, or more often if advised.  Bone density scan (DEXA): At age 50, ask your care team if you should have this scan for  osteoporosis (brittle bones).  Hepatitis C: Get tested at least once in your life.  STIs (sexually transmitted infections)  Before age 24: Ask your care team if you should be screened for STIs.  After age 24: Get screened for STIs if you're at risk. You are at risk for STIs (including HIV) if:  You are sexually active with more than one person.  You don't use condoms every time.  You or a partner was diagnosed with a sexually transmitted infection.  If you are at risk for HIV, ask about PrEP medicine to prevent HIV.  Get tested for HIV at least once in your life, whether you are at risk for HIV or not.  Cancer screening tests  Cervical cancer screening: If you have a cervix, begin getting regular cervical cancer screening tests starting at age 21.  Breast cancer scan (mammogram): If you've ever had breasts, begin having regular mammograms starting at age 40. This is a scan to check for breast cancer.  Colon cancer screening: It is important to start screening for colon cancer at age 45.  Have a colonoscopy test every 10 years (or more often if you're at risk) Or, ask your provider about stool tests like a FIT test every year or Cologuard test every 3 years.  To learn more about your testing options, visit:   .  For help making a decision, visit:   https://bit.ly/cc38489.  Prostate cancer screening test: If you have a prostate, ask your care team if a prostate cancer screening test (PSA) at age 55 is right for you.  Lung cancer screening: If you are a current or former smoker ages 50 to 80, ask your care team if ongoing lung cancer screenings are right for you.  For informational purposes only. Not to replace the advice of your health care provider. Copyright   2023 [employer]. All rights reserved. Clinically reviewed by the [facility]. [employer] [id number] - REV 01/24.  Preventing Falls: Care Instructions  Injuries and health problems such as trouble walking or poor eyesight can  increase your risk of falling. So can some medicines. But there are things you can do to help prevent falls. You can exercise to get stronger. You can also arrange your home to make it safer.    Talk to your doctor about the medicines you take. Ask if any of them increase the risk of falls and whether they can be changed or stopped.   Try to exercise regularly. It can help improve your strength and balance. This can help lower your risk of falling.         Practice fall safety and prevention.   Wear low-heeled shoes that fit well and give your feet good support. Talk to your doctor if you have foot problems that make this hard.  Carry a cellphone or wear a medical alert device that you can use to call for help.  Use stepladders instead of chairs to reach high objects. Don't climb if you're at risk for falls. Ask for help, if needed.  Wear the correct eyeglasses, if you need them.        Make your home safer.   Remove rugs, cords, clutter, and furniture from walkways.  Keep your house well lit. Use night-lights in hallways and bathrooms.  Install and use sturdy handrails on stairways.  Wear nonskid footwear, even inside. Don't walk barefoot or in socks without shoes.        Be safe outside.   Use handrails, curb cuts, and ramps whenever possible.  Keep your hands free by using a shoulder bag or backpack.  Try to walk in well-lit areas. Watch out for uneven ground, changes in pavement, and debris.  Be careful in the winter. Walk on the grass or gravel when sidewalks are slippery. Use de-icer on steps and walkways. Add non-slip devices to shoes.    Put grab bars and nonskid mats in your shower or tub and near the toilet. Try to use a shower chair or bath bench when bathing.   Get into a tub or shower by putting in your weaker leg first. Get out with your strong side first. Have a phone or medical alert device in the bathroom with you.   Where can you learn more?  Go to https://www.healthwise.net/patiented  Enter E423  "in the search box to learn more about \"Preventing Falls: Care Instructions.\"  Current as of: July 17, 2023  Content Version: 14.2 2024 [facility] [employer], [facility].   Care instructions adapted under license by your healthcare professional. If you have questions about a medical condition or this instruction, always ask your healthcare professional. [employer] disclaims any warranty or liability for your use of this information.       "

## 2024-12-17 ENCOUNTER — MYC MEDICAL ADVICE (OUTPATIENT)
Dept: FAMILY MEDICINE | Facility: CLINIC | Age: 69
End: 2024-12-17
Payer: COMMERCIAL

## 2024-12-17 DIAGNOSIS — I10 HYPERTENSION, UNSPECIFIED TYPE: Primary | ICD-10-CM

## 2025-01-09 ENCOUNTER — MYC MEDICAL ADVICE (OUTPATIENT)
Dept: FAMILY MEDICINE | Facility: CLINIC | Age: 70
End: 2025-01-09
Payer: COMMERCIAL

## 2025-01-09 DIAGNOSIS — Z78.0 POST-MENOPAUSAL: Primary | ICD-10-CM

## 2025-01-15 ENCOUNTER — TRANSFERRED RECORDS (OUTPATIENT)
Dept: HEALTH INFORMATION MANAGEMENT | Facility: CLINIC | Age: 70
End: 2025-01-15
Payer: COMMERCIAL

## 2025-01-22 ENCOUNTER — TRANSFERRED RECORDS (OUTPATIENT)
Dept: HEALTH INFORMATION MANAGEMENT | Facility: CLINIC | Age: 70
End: 2025-01-22

## 2025-02-05 ENCOUNTER — TRANSFERRED RECORDS (OUTPATIENT)
Dept: HEALTH INFORMATION MANAGEMENT | Facility: CLINIC | Age: 70
End: 2025-02-05
Payer: COMMERCIAL

## 2025-04-14 ENCOUNTER — ANCILLARY PROCEDURE (OUTPATIENT)
Dept: BONE DENSITY | Facility: CLINIC | Age: 70
End: 2025-04-14
Attending: FAMILY MEDICINE
Payer: COMMERCIAL

## 2025-04-14 DIAGNOSIS — Z78.0 POST-MENOPAUSAL: ICD-10-CM

## 2025-04-14 PROCEDURE — 77091 TBS TECHL CALCULATION ONLY: CPT | Performed by: PHYSICIAN ASSISTANT

## 2025-04-14 PROCEDURE — 77080 DXA BONE DENSITY AXIAL: CPT | Mod: TC | Performed by: PHYSICIAN ASSISTANT

## 2025-06-06 ENCOUNTER — TRANSFERRED RECORDS (OUTPATIENT)
Dept: HEALTH INFORMATION MANAGEMENT | Facility: CLINIC | Age: 70
End: 2025-06-06
Payer: COMMERCIAL

## 2025-08-03 ENCOUNTER — HEALTH MAINTENANCE LETTER (OUTPATIENT)
Age: 70
End: 2025-08-03

## 2025-08-28 ENCOUNTER — PATIENT OUTREACH (OUTPATIENT)
Dept: CARE COORDINATION | Facility: CLINIC | Age: 70
End: 2025-08-28
Payer: COMMERCIAL